# Patient Record
Sex: MALE | Race: WHITE | NOT HISPANIC OR LATINO | URBAN - METROPOLITAN AREA
[De-identification: names, ages, dates, MRNs, and addresses within clinical notes are randomized per-mention and may not be internally consistent; named-entity substitution may affect disease eponyms.]

---
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
## 2023-10-24 ENCOUNTER — INPATIENT (INPATIENT)
Facility: HOSPITAL | Age: 23
LOS: 1 days | Discharge: HOME | DRG: 917 | End: 2023-10-26
Attending: FAMILY MEDICINE | Admitting: FAMILY MEDICINE
Payer: COMMERCIAL

## 2023-10-24 VITALS
DIASTOLIC BLOOD PRESSURE: 65 MMHG | TEMPERATURE: 98 F | SYSTOLIC BLOOD PRESSURE: 118 MMHG | HEART RATE: 159 BPM | RESPIRATION RATE: 20 BRPM | OXYGEN SATURATION: 96 %

## 2023-10-24 DIAGNOSIS — F19.929 OTHER PSYCHOACTIVE SUBSTANCE USE, UNSPECIFIED WITH INTOXICATION, UNSPECIFIED: ICD-10-CM

## 2023-10-24 DIAGNOSIS — Z29.9 ENCOUNTER FOR PROPHYLACTIC MEASURES, UNSPECIFIED: ICD-10-CM

## 2023-10-24 DIAGNOSIS — R41.82 ALTERED MENTAL STATUS, UNSPECIFIED: ICD-10-CM

## 2023-10-24 LAB
ALBUMIN SERPL ELPH-MCNC: 3.4 G/DL — SIGNIFICANT CHANGE UP (ref 3.3–5)
ALBUMIN SERPL ELPH-MCNC: 3.4 G/DL — SIGNIFICANT CHANGE UP (ref 3.3–5)
ALP SERPL-CCNC: 45 U/L — SIGNIFICANT CHANGE UP (ref 40–120)
ALP SERPL-CCNC: 45 U/L — SIGNIFICANT CHANGE UP (ref 40–120)
ALT FLD-CCNC: 32 U/L — SIGNIFICANT CHANGE UP (ref 12–78)
ALT FLD-CCNC: 32 U/L — SIGNIFICANT CHANGE UP (ref 12–78)
ANION GAP SERPL CALC-SCNC: 7 MMOL/L — SIGNIFICANT CHANGE UP (ref 5–17)
ANION GAP SERPL CALC-SCNC: 7 MMOL/L — SIGNIFICANT CHANGE UP (ref 5–17)
APAP SERPL-MCNC: <2 UG/ML — LOW (ref 10–30)
APAP SERPL-MCNC: <2 UG/ML — LOW (ref 10–30)
APPEARANCE UR: CLEAR — SIGNIFICANT CHANGE UP
APPEARANCE UR: CLEAR — SIGNIFICANT CHANGE UP
AST SERPL-CCNC: 16 U/L — SIGNIFICANT CHANGE UP (ref 15–37)
AST SERPL-CCNC: 16 U/L — SIGNIFICANT CHANGE UP (ref 15–37)
BASOPHILS # BLD AUTO: 0.04 K/UL — SIGNIFICANT CHANGE UP (ref 0–0.2)
BASOPHILS # BLD AUTO: 0.04 K/UL — SIGNIFICANT CHANGE UP (ref 0–0.2)
BASOPHILS NFR BLD AUTO: 0.4 % — SIGNIFICANT CHANGE UP (ref 0–2)
BASOPHILS NFR BLD AUTO: 0.4 % — SIGNIFICANT CHANGE UP (ref 0–2)
BILIRUB SERPL-MCNC: 0.9 MG/DL — SIGNIFICANT CHANGE UP (ref 0.2–1.2)
BILIRUB SERPL-MCNC: 0.9 MG/DL — SIGNIFICANT CHANGE UP (ref 0.2–1.2)
BILIRUB UR-MCNC: NEGATIVE — SIGNIFICANT CHANGE UP
BILIRUB UR-MCNC: NEGATIVE — SIGNIFICANT CHANGE UP
BUN SERPL-MCNC: 11 MG/DL — SIGNIFICANT CHANGE UP (ref 7–23)
BUN SERPL-MCNC: 11 MG/DL — SIGNIFICANT CHANGE UP (ref 7–23)
CALCIUM SERPL-MCNC: 8.1 MG/DL — LOW (ref 8.5–10.1)
CALCIUM SERPL-MCNC: 8.1 MG/DL — LOW (ref 8.5–10.1)
CHLORIDE SERPL-SCNC: 111 MMOL/L — HIGH (ref 96–108)
CHLORIDE SERPL-SCNC: 111 MMOL/L — HIGH (ref 96–108)
CK SERPL-CCNC: 84 U/L — SIGNIFICANT CHANGE UP (ref 26–308)
CK SERPL-CCNC: 84 U/L — SIGNIFICANT CHANGE UP (ref 26–308)
CO2 SERPL-SCNC: 28 MMOL/L — SIGNIFICANT CHANGE UP (ref 22–31)
CO2 SERPL-SCNC: 28 MMOL/L — SIGNIFICANT CHANGE UP (ref 22–31)
COLOR SPEC: YELLOW — SIGNIFICANT CHANGE UP
COLOR SPEC: YELLOW — SIGNIFICANT CHANGE UP
CREAT SERPL-MCNC: 0.84 MG/DL — SIGNIFICANT CHANGE UP (ref 0.5–1.3)
CREAT SERPL-MCNC: 0.84 MG/DL — SIGNIFICANT CHANGE UP (ref 0.5–1.3)
DIFF PNL FLD: NEGATIVE — SIGNIFICANT CHANGE UP
DIFF PNL FLD: NEGATIVE — SIGNIFICANT CHANGE UP
EGFR: 127 ML/MIN/1.73M2 — SIGNIFICANT CHANGE UP
EGFR: 127 ML/MIN/1.73M2 — SIGNIFICANT CHANGE UP
EOSINOPHIL # BLD AUTO: 0.06 K/UL — SIGNIFICANT CHANGE UP (ref 0–0.5)
EOSINOPHIL # BLD AUTO: 0.06 K/UL — SIGNIFICANT CHANGE UP (ref 0–0.5)
EOSINOPHIL NFR BLD AUTO: 0.6 % — SIGNIFICANT CHANGE UP (ref 0–6)
EOSINOPHIL NFR BLD AUTO: 0.6 % — SIGNIFICANT CHANGE UP (ref 0–6)
ETHANOL SERPL-MCNC: <10 MG/DL — SIGNIFICANT CHANGE UP (ref 0–10)
ETHANOL SERPL-MCNC: <10 MG/DL — SIGNIFICANT CHANGE UP (ref 0–10)
GLUCOSE SERPL-MCNC: 105 MG/DL — HIGH (ref 70–99)
GLUCOSE SERPL-MCNC: 105 MG/DL — HIGH (ref 70–99)
GLUCOSE UR QL: NEGATIVE MG/DL — SIGNIFICANT CHANGE UP
GLUCOSE UR QL: NEGATIVE MG/DL — SIGNIFICANT CHANGE UP
HCT VFR BLD CALC: 42.1 % — SIGNIFICANT CHANGE UP (ref 39–50)
HCT VFR BLD CALC: 42.1 % — SIGNIFICANT CHANGE UP (ref 39–50)
HGB BLD-MCNC: 14.7 G/DL — SIGNIFICANT CHANGE UP (ref 13–17)
HGB BLD-MCNC: 14.7 G/DL — SIGNIFICANT CHANGE UP (ref 13–17)
IMM GRANULOCYTES NFR BLD AUTO: 0.3 % — SIGNIFICANT CHANGE UP (ref 0–0.9)
IMM GRANULOCYTES NFR BLD AUTO: 0.3 % — SIGNIFICANT CHANGE UP (ref 0–0.9)
KETONES UR-MCNC: NEGATIVE MG/DL — SIGNIFICANT CHANGE UP
KETONES UR-MCNC: NEGATIVE MG/DL — SIGNIFICANT CHANGE UP
LACTATE SERPL-SCNC: 1.7 MMOL/L — SIGNIFICANT CHANGE UP (ref 0.7–2)
LACTATE SERPL-SCNC: 1.7 MMOL/L — SIGNIFICANT CHANGE UP (ref 0.7–2)
LACTATE SERPL-SCNC: 2.2 MMOL/L — HIGH (ref 0.7–2)
LACTATE SERPL-SCNC: 2.2 MMOL/L — HIGH (ref 0.7–2)
LEUKOCYTE ESTERASE UR-ACNC: NEGATIVE — SIGNIFICANT CHANGE UP
LEUKOCYTE ESTERASE UR-ACNC: NEGATIVE — SIGNIFICANT CHANGE UP
LIDOCAIN IGE QN: 18 U/L — SIGNIFICANT CHANGE UP (ref 13–75)
LIDOCAIN IGE QN: 18 U/L — SIGNIFICANT CHANGE UP (ref 13–75)
LYMPHOCYTES # BLD AUTO: 0.42 K/UL — LOW (ref 1–3.3)
LYMPHOCYTES # BLD AUTO: 0.42 K/UL — LOW (ref 1–3.3)
LYMPHOCYTES # BLD AUTO: 4 % — LOW (ref 13–44)
LYMPHOCYTES # BLD AUTO: 4 % — LOW (ref 13–44)
MAGNESIUM SERPL-MCNC: 2 MG/DL — SIGNIFICANT CHANGE UP (ref 1.6–2.6)
MAGNESIUM SERPL-MCNC: 2 MG/DL — SIGNIFICANT CHANGE UP (ref 1.6–2.6)
MCHC RBC-ENTMCNC: 28.2 PG — SIGNIFICANT CHANGE UP (ref 27–34)
MCHC RBC-ENTMCNC: 28.2 PG — SIGNIFICANT CHANGE UP (ref 27–34)
MCHC RBC-ENTMCNC: 34.9 GM/DL — SIGNIFICANT CHANGE UP (ref 32–36)
MCHC RBC-ENTMCNC: 34.9 GM/DL — SIGNIFICANT CHANGE UP (ref 32–36)
MCV RBC AUTO: 80.8 FL — SIGNIFICANT CHANGE UP (ref 80–100)
MCV RBC AUTO: 80.8 FL — SIGNIFICANT CHANGE UP (ref 80–100)
MONOCYTES # BLD AUTO: 0.71 K/UL — SIGNIFICANT CHANGE UP (ref 0–0.9)
MONOCYTES # BLD AUTO: 0.71 K/UL — SIGNIFICANT CHANGE UP (ref 0–0.9)
MONOCYTES NFR BLD AUTO: 6.7 % — SIGNIFICANT CHANGE UP (ref 2–14)
MONOCYTES NFR BLD AUTO: 6.7 % — SIGNIFICANT CHANGE UP (ref 2–14)
NEUTROPHILS # BLD AUTO: 9.32 K/UL — HIGH (ref 1.8–7.4)
NEUTROPHILS # BLD AUTO: 9.32 K/UL — HIGH (ref 1.8–7.4)
NEUTROPHILS NFR BLD AUTO: 88 % — HIGH (ref 43–77)
NEUTROPHILS NFR BLD AUTO: 88 % — HIGH (ref 43–77)
NITRITE UR-MCNC: NEGATIVE — SIGNIFICANT CHANGE UP
NITRITE UR-MCNC: NEGATIVE — SIGNIFICANT CHANGE UP
NRBC # BLD: 0 /100 WBCS — SIGNIFICANT CHANGE UP (ref 0–0)
NRBC # BLD: 0 /100 WBCS — SIGNIFICANT CHANGE UP (ref 0–0)
PCP SPEC-MCNC: SIGNIFICANT CHANGE UP
PCP SPEC-MCNC: SIGNIFICANT CHANGE UP
PH UR: 6 — SIGNIFICANT CHANGE UP (ref 5–8)
PH UR: 6 — SIGNIFICANT CHANGE UP (ref 5–8)
PLATELET # BLD AUTO: 187 K/UL — SIGNIFICANT CHANGE UP (ref 150–400)
PLATELET # BLD AUTO: 187 K/UL — SIGNIFICANT CHANGE UP (ref 150–400)
POTASSIUM SERPL-MCNC: 3.9 MMOL/L — SIGNIFICANT CHANGE UP (ref 3.5–5.3)
POTASSIUM SERPL-MCNC: 3.9 MMOL/L — SIGNIFICANT CHANGE UP (ref 3.5–5.3)
POTASSIUM SERPL-SCNC: 3.9 MMOL/L — SIGNIFICANT CHANGE UP (ref 3.5–5.3)
POTASSIUM SERPL-SCNC: 3.9 MMOL/L — SIGNIFICANT CHANGE UP (ref 3.5–5.3)
PROT SERPL-MCNC: 6.4 G/DL — SIGNIFICANT CHANGE UP (ref 6–8.3)
PROT SERPL-MCNC: 6.4 G/DL — SIGNIFICANT CHANGE UP (ref 6–8.3)
PROT UR-MCNC: NEGATIVE MG/DL — SIGNIFICANT CHANGE UP
PROT UR-MCNC: NEGATIVE MG/DL — SIGNIFICANT CHANGE UP
RBC # BLD: 5.21 M/UL — SIGNIFICANT CHANGE UP (ref 4.2–5.8)
RBC # BLD: 5.21 M/UL — SIGNIFICANT CHANGE UP (ref 4.2–5.8)
RBC # FLD: 12.1 % — SIGNIFICANT CHANGE UP (ref 10.3–14.5)
RBC # FLD: 12.1 % — SIGNIFICANT CHANGE UP (ref 10.3–14.5)
SALICYLATES SERPL-MCNC: <1.7 MG/DL — LOW (ref 2.8–20)
SALICYLATES SERPL-MCNC: <1.7 MG/DL — LOW (ref 2.8–20)
SODIUM SERPL-SCNC: 146 MMOL/L — HIGH (ref 135–145)
SODIUM SERPL-SCNC: 146 MMOL/L — HIGH (ref 135–145)
SP GR SPEC: 1.01 — SIGNIFICANT CHANGE UP (ref 1–1.03)
SP GR SPEC: 1.01 — SIGNIFICANT CHANGE UP (ref 1–1.03)
TROPONIN I, HIGH SENSITIVITY RESULT: 4.7 NG/L — SIGNIFICANT CHANGE UP
TROPONIN I, HIGH SENSITIVITY RESULT: 4.7 NG/L — SIGNIFICANT CHANGE UP
TSH SERPL-MCNC: 0.31 UIU/ML — LOW (ref 0.36–3.74)
TSH SERPL-MCNC: 0.31 UIU/ML — LOW (ref 0.36–3.74)
UROBILINOGEN FLD QL: 1 MG/DL — SIGNIFICANT CHANGE UP (ref 0.2–1)
UROBILINOGEN FLD QL: 1 MG/DL — SIGNIFICANT CHANGE UP (ref 0.2–1)
WBC # BLD: 10.58 K/UL — HIGH (ref 3.8–10.5)
WBC # BLD: 10.58 K/UL — HIGH (ref 3.8–10.5)
WBC # FLD AUTO: 10.58 K/UL — HIGH (ref 3.8–10.5)
WBC # FLD AUTO: 10.58 K/UL — HIGH (ref 3.8–10.5)

## 2023-10-24 PROCEDURE — 70498 CT ANGIOGRAPHY NECK: CPT | Mod: 26,MA

## 2023-10-24 PROCEDURE — 70496 CT ANGIOGRAPHY HEAD: CPT | Mod: 26,MA

## 2023-10-24 PROCEDURE — 70450 CT HEAD/BRAIN W/O DYE: CPT | Mod: 26,MA

## 2023-10-24 PROCEDURE — 99285 EMERGENCY DEPT VISIT HI MDM: CPT

## 2023-10-24 PROCEDURE — 99223 1ST HOSP IP/OBS HIGH 75: CPT

## 2023-10-24 PROCEDURE — 71045 X-RAY EXAM CHEST 1 VIEW: CPT | Mod: 26

## 2023-10-24 RX ORDER — MORPHINE SULFATE 50 MG/1
4 CAPSULE, EXTENDED RELEASE ORAL ONCE
Refills: 0 | Status: DISCONTINUED | OUTPATIENT
Start: 2023-10-24 | End: 2023-10-24

## 2023-10-24 RX ORDER — ONDANSETRON 8 MG/1
4 TABLET, FILM COATED ORAL ONCE
Refills: 0 | Status: DISCONTINUED | OUTPATIENT
Start: 2023-10-24 | End: 2023-10-24

## 2023-10-24 RX ORDER — ONDANSETRON 8 MG/1
4 TABLET, FILM COATED ORAL EVERY 8 HOURS
Refills: 0 | Status: DISCONTINUED | OUTPATIENT
Start: 2023-10-24 | End: 2023-10-26

## 2023-10-24 RX ORDER — LANOLIN ALCOHOL/MO/W.PET/CERES
3 CREAM (GRAM) TOPICAL AT BEDTIME
Refills: 0 | Status: DISCONTINUED | OUTPATIENT
Start: 2023-10-24 | End: 2023-10-26

## 2023-10-24 RX ORDER — SODIUM CHLORIDE 9 MG/ML
1000 INJECTION, SOLUTION INTRAVENOUS
Refills: 0 | Status: DISCONTINUED | OUTPATIENT
Start: 2023-10-24 | End: 2023-10-25

## 2023-10-24 RX ORDER — SODIUM CHLORIDE 9 MG/ML
1000 INJECTION INTRAMUSCULAR; INTRAVENOUS; SUBCUTANEOUS ONCE
Refills: 0 | Status: COMPLETED | OUTPATIENT
Start: 2023-10-24 | End: 2023-10-24

## 2023-10-24 RX ORDER — THIAMINE MONONITRATE (VIT B1) 100 MG
500 TABLET ORAL DAILY
Refills: 0 | Status: DISCONTINUED | OUTPATIENT
Start: 2023-10-24 | End: 2023-10-26

## 2023-10-24 RX ORDER — FAMOTIDINE 10 MG/ML
20 INJECTION INTRAVENOUS ONCE
Refills: 0 | Status: COMPLETED | OUTPATIENT
Start: 2023-10-24 | End: 2023-10-24

## 2023-10-24 RX ORDER — INFLUENZA VIRUS VACCINE 15; 15; 15; 15 UG/.5ML; UG/.5ML; UG/.5ML; UG/.5ML
0.5 SUSPENSION INTRAMUSCULAR ONCE
Refills: 0 | Status: DISCONTINUED | OUTPATIENT
Start: 2023-10-24 | End: 2023-10-26

## 2023-10-24 RX ORDER — SODIUM CHLORIDE 9 MG/ML
2000 INJECTION INTRAMUSCULAR; INTRAVENOUS; SUBCUTANEOUS ONCE
Refills: 0 | Status: COMPLETED | OUTPATIENT
Start: 2023-10-24 | End: 2023-10-24

## 2023-10-24 RX ADMIN — SODIUM CHLORIDE 1000 MILLILITER(S): 9 INJECTION INTRAMUSCULAR; INTRAVENOUS; SUBCUTANEOUS at 09:39

## 2023-10-24 RX ADMIN — SODIUM CHLORIDE 75 MILLILITER(S): 9 INJECTION, SOLUTION INTRAVENOUS at 13:12

## 2023-10-24 RX ADMIN — SODIUM CHLORIDE 2000 MILLILITER(S): 9 INJECTION INTRAMUSCULAR; INTRAVENOUS; SUBCUTANEOUS at 06:49

## 2023-10-24 RX ADMIN — FAMOTIDINE 20 MILLIGRAM(S): 10 INJECTION INTRAVENOUS at 06:49

## 2023-10-24 RX ADMIN — Medication 105 MILLIGRAM(S): at 23:05

## 2023-10-24 NOTE — CONSULT NOTE ADULT - PROBLEM SELECTOR RECOMMENDATION 9
MD Gonzales: 23-year-old male brought in by family for evaluation of possible intoxication.  Patient has access to multiple substances that could explain his clinical presentation.  Tachycardia, dry skin, full bladder point towards antimuscarinic toxicity which could be caused from multiple potential substances; most common culprit is diphenhydramine.  Trial of rivastigmine patch is appropriate if available.    Patient also has access to fatty butte powder which is a GERMAN B agonist.  This substance can be consumed for its sedating purposes as well as to improve sleep quality.  However it is highly addictive and regular use can result in dependence that can manifest with significant withdrawal symptoms.  Although clinically the same as benzodiazepine or alcohol withdrawal, it does not respond well to treatment with GERMAN a agonist such as benzodiazepines, phenobarbital and/or propofol.  Should the patient exhibit symptoms consistent with sedative-hypnotic withdrawal we would advocate for treating the patient with a GERMAN-B agonist such as baclofen.    Recommendations:  Trial of physostigmine patch may prove to be diagnostic if his mental status improves to baseline.  However it is not necessary that this be performed.  In general xenobiotic intoxications tend to improve over time.    Withdrawal syndromes tend to worsen over the first 36 hours of admission.    If patient becomes febrile, hyperthermic or exhibits worsening altered mental status would have a low threshold to consider alternative diagnoses such as meningeal encephalitis and should be worked up as such. MD Gonzales: 23-year-old male brought in by family for evaluation of possible intoxication.  Patient has access to multiple substances that could explain his clinical presentation.  Tachycardia, dry skin, full bladder point towards antimuscarinic toxicity which could be caused from multiple potential substances; most common culprit is diphenhydramine.  Trial of rivastigmine patch is appropriate if available.    Patient also has access to Phenybut powder which is a GERMAN B agonist.  This substance can be consumed for its sedating purposes as well as to improve sleep quality.  However it is highly addictive and regular use can result in dependence that can manifest with significant withdrawal symptoms.  Although clinically the same as benzodiazepine or alcohol withdrawal, it does not respond well to treatment with GERMAN a agonist such as benzodiazepines, phenobarbital and/or propofol.  Should the patient exhibit symptoms consistent with sedative-hypnotic withdrawal we would advocate for treating the patient with a GERMAN-B agonist such as baclofen.    Recommendations:  Trial of physostigmine patch may prove to be diagnostic if his mental status improves to baseline.  However it is not necessary that this be performed.  In general xenobiotic intoxications tend to improve over time.    Withdrawal syndromes tend to worsen over the first 36 hours of admission.    If patient becomes febrile, hyperthermic or exhibits worsening altered mental status would have a low threshold to consider alternative diagnoses such as meningeal encephalitis and should be worked up as such.

## 2023-10-24 NOTE — H&P ADULT - NSHPSOCIALHISTORY_GEN_ALL_CORE
Single, just graduated college from [employer], lives with mother. Mother denies smoking history, occasional EtOH use
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
## 2023-10-24 NOTE — CONSULT NOTE ADULT - ASSESSMENT
24 yo M, reportedly healthy, who presents for altered mental status. Toxicology consulted for possible xenobiotic ingestion. Around 4 am, patient was awake and told mom he was not feeling well and emesis, then progressively became obtunded. Other than ethanol, no other acute ingestion reported by patient. Patient has access to phenibut apixaban, nebivolol, and common OTC medications including diphenhydramine. Patient was initially tachycardic to 140s with otherwise normal vitals. HR improving to 110s now. Exam shows initially dilated pupils, dazed, dry mucosa and axilla, and urinary retention. EKG unremarkable. Labs showed Na 146, Cl 111, ca 8.1, lactate 2.2.     It is unclear what is causing the patient's symptoms at this time. From a toxicologic standpoint, the patient's physical examination and vital signs is most consistent with antimuscarinic/anticholinergic toxicity given tachycardia, mydriasis, dry mucosa and axilla, dazed appearance, and urinary retention. Antimuscarinic toxidrome consists of delirium, carphologia, agitation, tachycardia, urinary retention, flushed skin, dry skin and mucus membrane, hypoactive bowel sounds, and/or seizures. Common medications with antimuscarinic effects include 1st generation anti-histamines, antipsychotics, and atropine.    The patient also has access to phenibut, L-theanine, and an unknown dropper bottle. Recreational drug users of GHB and GBL often store and use it from a dropper bottle. Phenibut, GHB, and GBL are GERMAN-B agonists, which can cause a sedative hypnotic toxidrome in overdose, and with abrupt cessation can cause withdrawal. Treatment for acute overdose is largely supportive. Treatment of withdrawal is replacement of GERMAN-B agonism with a gradual taper, and baclofen would be agent of choice.     #Recommendations  - Consider other medical etiologies   - Given clinical suspicion of antimuscarinic toxicity, recommend rivastigmine patch 9.5 mg/24 hours, placed on the upper back. Watch for excessive cholinergic activity as adverse effect, which can include bradycardia, bronchorrhea, bronchospasm, salivation, diarrhea, urination, and lacrimation. If severe adverse effects occur (rare), please remove patch. If severe adverse effects such as bradycardia, bronchorrhea, or bronchospasm, consider atropine administration and call toxicology team  - Watch for signs of GERMAN-B withdrawal (similar clinical findings as ethanol or benzodiazepine withdrawal). If they occur, consider baclofen 5 mg every 8 hours PO or NG, and call toxicology team. May need titration to effect. IV benzodiazepine may be used as an adjunct, however may not be effective in GERMAN-B withdrawal.  - Recommend admission to monitor rectal temperature, telemetry, and assess for above toxicities  - Please obtain caffeine level, acetaminophen level, salicylate level, Mg, CK, and thyroid testing    Thank you for involving us in the care of this patient. Assessment and plan discussed with toxicology attending Dr. Nilson Gonzales. Please do not hesitate to reach out to the toxicology team for any further questions or concerns.    The On-Call Toxicology Fellow can be reached 24/7 via Pager #[phone]  Please send a 10 digit call back # as [place] cover multiple hospitals    Ramon Thomas MD  Toxicology Fellow  PGY-5     24 yo M, reportedly healthy, who presents for altered mental status. Toxicology consulted for possible xenobiotic ingestion. Around 4 am, patient was awake and told mom he was not feeling well and emesis, then progressively became obtunded. Other than ethanol, no other acute ingestion reported by patient. Patient has access to phenibut apixaban, nebivolol, and common OTC medications including diphenhydramine. Patient was initially tachycardic to 140s with otherwise normal vitals. HR improving to 110s now. Exam shows initially dilated pupils, dazed, dry mucosa and axilla, and urinary retention. EKG unremarkable. Labs showed Na 146, Cl 111, ca 8.1, lactate 2.2.     It is unclear what is causing the patient's symptoms at this time. From a toxicologic standpoint, the patient's physical examination and vital signs is most consistent with antimuscarinic/anticholinergic toxicity given tachycardia, mydriasis, dry mucosa and axilla, dazed appearance, and urinary retention. Antimuscarinic toxidrome consists of delirium, carphologia, agitation, tachycardia, urinary retention, flushed skin, dry skin and mucus membrane, hypoactive bowel sounds, and/or seizures. Common medications with antimuscarinic effects include 1st generation anti-histamines, antipsychotics, and atropine.    The patient also has access to phenibut, L-theanine, and an unknown dropper bottle. Recreational drug users of GHB and GBL often store and use it from a dropper bottle. Phenibut, GHB, and GBL are GERMAN-B agonists, which can cause a sedative hypnotic toxidrome in overdose, and with abrupt cessation can cause withdrawal. Treatment for acute overdose is largely supportive. Treatment of withdrawal is replacement of GERMAN-B agonism with a gradual taper, and baclofen would be agent of choice.     #Recommendations  - Consider other medical etiologies   - Given clinical suspicion of antimuscarinic toxicity, recommend rivastigmine patch 9.5 mg/24 hours, placed on the upper back. Watch for excessive cholinergic activity as adverse effect, which can include bradycardia, bronchorrhea, bronchospasm, salivation, diarrhea, urination, and lacrimation. If severe adverse effects occur (rare), please remove patch. If severe adverse effects such as bradycardia, bronchorrhea, or bronchospasm, consider atropine administration and call toxicology team  - Watch for signs of GERMAN-B withdrawal (similar clinical findings as ethanol or benzodiazepine withdrawal). If they occur, consider baclofen as detailed below, and call toxicology team. May need titration to effect. IV benzodiazepine may be used as an adjunct, however may not be effective in GERMAN-B withdrawal.  - Recommend admission to monitor rectal temperature, telemetry, and assess for above toxicities  - Please obtain caffeine level, acetaminophen level, salicylate level, Mg, CK, and thyroid testing    Baclofen Protocol for GERMAN-B Withdrawal  Give patient 20mg PO baclofen now and reassess to see if pts symptoms have improved in 1hr, if pt still with significant withdrawal symptoms 1hr after, give another 10mg PO Baclofen. Admit patient and put patient on CIWA protocol. Symptomatically treat patient with baclofen (analogous to stabilizing a patient with benzos for alcohol withdrawal). Goal is to figure out patient’s 24hr baclofen requirement (total baclofen given within 24hrs). Once 24hr requirement obtained, give this over three doses (TID). Continue induction dose for two days. Then gradually taper dose every two days by 5mg. An example is given below.     -If currently stabilized with 20 mg Baclofen PO per day  -Continue Baclofen 20 mg for 2 days from induction  -On days 3-4: give 15 mg Baclofen PO  -On days 5-6: give 10 mg Baclofen PO  -On days 6-7: Give 5 mg Baclofen PO  -On day 8: DC Baclofen    Thank you for involving us in the care of this patient. Assessment and plan discussed with toxicology attending Dr. Nilson Gonzales. Please do not hesitate to reach out to the toxicology team for any further questions or concerns.    The On-Call Toxicology Fellow can be reached 24/7 via Pager #[phone]  Please send a 10 digit call back # as [place] cover multiple hospitals    Ramon Thomas MD  Toxicology Fellow  PGY-5     24 yo M, reportedly healthy, who presents for altered mental status. Toxicology consulted for possible xenobiotic ingestion. Around 4 am, patient was awake and told mom he was not feeling well and emesis, then progressively became obtunded. Other than ethanol, no other acute ingestion reported by patient. Patient has access to phenibut apixaban, nebivolol, and common OTC medications including diphenhydramine. Patient was initially tachycardic to 140s with otherwise normal vitals. HR improving to 110s now. Exam shows initially dilated pupils, dazed, dry mucosa and axilla, and urinary retention. EKG unremarkable. Labs showed Na 146, Cl 111, ca 8.1, lactate 2.2.     It is unclear what is causing the patient's symptoms at this time. From a toxicologic standpoint, the patient's physical examination and vital signs is most consistent with antimuscarinic/anticholinergic toxicity given tachycardia, mydriasis, dry mucosa and axilla, dazed appearance, and urinary retention. Antimuscarinic toxidrome consists of delirium, carphologia, agitation, tachycardia, urinary retention, flushed skin, dry skin and mucus membrane, hypoactive bowel sounds, and/or seizures. Common medications with antimuscarinic effects include 1st generation anti-histamines, antipsychotics, and atropine.    The patient also has access to phenibut, L-theanine, and an unknown dropper bottle. Recreational drug users of GHB and GBL often store and use it from a dropper bottle. Phenibut, GHB, and GBL are GERMAN-B agonists, which can cause a sedative hypnotic toxidrome in overdose, and with abrupt cessation can cause withdrawal. Treatment for acute overdose is largely supportive. Treatment of withdrawal is replacement of GERMAN-B agonism with a gradual taper, and baclofen would be agent of choice.     #Recommendations  - Consider other medical etiologies   - Given clinical suspicion of antimuscarinic toxicity, recommend rivastigmine patch 9.5 mg/24 hours, placed on the upper back. Watch for excessive cholinergic activity as adverse effect, which can include bradycardia, bronchorrhea, bronchospasm, salivation, diarrhea, urination, and lacrimation. If severe adverse effects occur (rare), please remove patch. If severe adverse effects such as bradycardia, bronchorrhea, or bronchospasm, consider atropine administration and call toxicology team  - Watch for signs of GERMAN-B withdrawal (similar clinical findings as ethanol or benzodiazepine withdrawal). If they occur, consider baclofen as detailed below, and call toxicology team. May need titration to effect. IV benzodiazepine may be used as an adjunct, however may not be effective in GERMAN-B withdrawal.  - Recommend admission to monitor rectal temperature, telemetry, and assess for above toxicities  - Please obtain caffeine level, acetaminophen level, salicylate level, Mg, CK, and thyroid testing    Baclofen Protocol for GERMAN-B Withdrawal  If patient has signs and symptoms of GERMAN-B withdrawal, give patient 20mg PO baclofen now and reassess to see if pts symptoms have improved in 1hr, if pt still with significant withdrawal symptoms 1hr after, give another 10mg PO Baclofen. Admit patient and put patient on CIWA protocol. Symptomatically treat patient with baclofen (analogous to stabilizing a patient with benzos for alcohol withdrawal). Goal is to figure out patient’s 24hr baclofen requirement (total baclofen given within 24hrs). Once 24hr requirement obtained, give this over three doses (TID). Continue induction dose for two days. Then gradually taper dose every two days by 5mg. An example is given below.     -If currently stabilized with 20 mg Baclofen PO per day  -Continue Baclofen 20 mg for 2 days from induction  -On days 3-4: give 15 mg Baclofen PO  -On days 5-6: give 10 mg Baclofen PO  -On days 6-7: Give 5 mg Baclofen PO  -On day 8: DC Baclofen    Thank you for involving us in the care of this patient. Assessment and plan discussed with toxicology attending Dr. Nilson Gonzales. Please do not hesitate to reach out to the toxicology team for any further questions or concerns.    The On-Call Toxicology Fellow can be reached 24/7 via Pager #[phone]  Please send a 10 digit call back # as [place] cover multiple hospitals    Ramon Thomas MD  Toxicology Fellow  PGY-5

## 2023-10-24 NOTE — H&P ADULT - PROBLEM SELECTOR PLAN 1
-Possibly due to metabolic encephalopathy from toxin ingestion  -Toxicology consulted, appreciate recs  -Monitor on remote tele  -CT head and CTA head/neck negative for acute process  -If patient's mental status does not improve with 24 hours or patient becomes febrile, worsening tachycardia, may need to consider other etiologies for AMS  -Consider Neuro coin AM if no improvement in mental status  -Continue IVF for hydration, maintain NPO for now

## 2023-10-24 NOTE — CONSULT NOTE ADULT - ATTENDING COMMENTS
Patient resting comfortably. NAD. NSR on tele. VSS. CE #2 negative. EKG unchanged. Contiune to monitor. Proceed with Nuclear Stress test in am  tox

## 2023-10-24 NOTE — ED ADULT NURSE NOTE - NSFALLUNIVINTERV_ED_ALL_ED
Bed/Stretcher in lowest position, wheels locked, appropriate side rails in place/Call bell, personal items and telephone in reach/Instruct patient to call for assistance before getting out of bed/chair/stretcher/Non-slip footwear applied when patient is off stretcher/[place] to call system/Physically safe environment - no spills, clutter or unnecessary equipment/Purposeful proactive rounding/Room/bathroom lighting operational, light cord in reach

## 2023-10-24 NOTE — PATIENT PROFILE ADULT - FALL HARM RISK - DEVICES
None Bed/Stretcher in lowest position, wheels locked, appropriate side rails in place/Call bell, personal items and telephone in reach/Instruct patient to call for assistance before getting out of bed/chair/stretcher/Non-slip footwear applied when patient is off stretcher/[place] to call system/Physically safe environment - no spills, clutter or unnecessary equipment/Purposeful proactive rounding/Room/bathroom lighting operational, light cord in reach

## 2023-10-24 NOTE — ED PROVIDER NOTE - PROGRESS NOTE DETAILS
HR improved  CTH neg  CXR neg  Labs unremarkable  ETOH level neg  Utox pending  Pt protecting his airway  Mother at bedside, shared all results with her

## 2023-10-24 NOTE — PATIENT PROFILE ADULT - FALL HARM RISK - HARM RISK INTERVENTIONS
Assistance with ambulation/Assistance OOB with selected safe patient handling equipment/Communicate Risk of Fall with Harm to all staff/Monitor for mental status changes/Monitor gait and stability/Reinforce activity limits and safety measures with patient and family/Tailored Fall Risk Interventions/Toileting schedule using arm’s reach rule for commode and bathroom/Use of alarms - bed, chair and/or voice tab/Visual Cue: Yellow wristband and red socks/Bed in lowest position, wheels locked, appropriate side rails in place/Call bell, personal items and telephone in reach/Instruct patient to call for assistance before getting out of bed or chair/Non-slip footwear when patient is out of bed/[place] to call system/Physically safe environment - no spills, clutter or unnecessary equipment/Purposeful Proactive Rounding/Room/bathroom lighting operational, light cord in reach

## 2023-10-24 NOTE — CHART NOTE - NSCHARTNOTEFT_GEN_A_CORE
- RN called that tele monitor showed pt had nonsustained PAT with HR of 130s that decreased to 97   - vitals taken at the time were within normal limits, HR 97  - pt was sleeping, asx  - Mg and Phos ordered for AM  - RN to call with any concerns

## 2023-10-24 NOTE — H&P ADULT - NSICDXFAMILYHX_GEN_ALL_CORE_FT
FAMILY HISTORY:  Grandparent  Still living? Unknown  Family history of CVA, Age at diagnosis: Age Unknown  FH: myocardial infarction, Age at diagnosis: Age Unknown  FHx: skin cancer, Age at diagnosis: Age Unknown

## 2023-10-24 NOTE — H&P ADULT - HISTORY OF PRESENT ILLNESS
24 y/o M with no significant PMHx presents to ED via EMS for vomiting and altered mental status. Patient lethargic and unable to participate in HPI/ROS, collateral information obtained from family at bedside.  22 y/o M with no significant PMHx presents to ED via EMS for vomiting and altered mental status. Patient lethargic and unable to participate in HPI/ROS, collateral information obtained from family at bedside. As per mother, patient awoke her around 4:00/4:30 Am stating he was not feeling well. Mother states patient proceeded to have 4-5 episodes of non-bloody vomitus. Patient had disclosed he had drank 6-7 cans of hard seltzer earlier in the night. Mother states  22 y/o M with no significant PMHx presents to ED via EMS for vomiting and altered mental status. Patient lethargic and unable to participate in HPI/ROS, collateral information obtained from family at bedside. As per mother, patient awoke her around 4:00/4:30 Am stating he was not feeling well. Mother states patient proceeded to have 4-5 episodes of non-bloody vomitus. Patient had disclosed he had drank 6-7 cans of hard seltzer earlier in the night. Mother states patient had HR in 140s and she decided to call EMS to bring him to the hospital for further evaluation. Upon arrival to ED, patient somnolent, awakens to sternal rub and loud noises, but not responding questions and falls back asleep.    In ED, patient with negative Utox, negative EtOH level. Patient received pepcid 20mg IV x1, and NS bolus x2, then continued on maintenance IVF. Patient had CT head and CTA head and neck showing unremarkable CT head and no hemodynamic significant narrowing within the neck. No proximal large vessel occlusion intracranially. Case discussed with Toxicology fellow- States patient's mom went back home, and she discovered the patient also takes inositol supplement, phenibut powder, F-Phenibut powder, l-theanine, and vitamin D. However, patient's symptoms more suggestive of with antimuscarinic/anticholinergic toxicity given tachycardia, mydriasis, dry mucosa and axilla, dazed appearance, and urinary retention. Recommend supportive care and observation at this time.

## 2023-10-24 NOTE — PROVIDER CONTACT NOTE (OTHER) - SITUATION
Notified provider that call received from tele that pt  HR in 130s did not sustain and went back down to 97 per tele tech.

## 2023-10-24 NOTE — ED ADULT NURSE NOTE - OBJECTIVE STATEMENT
Received Pt sleepy but arousable, as per mother Pt drank 6 drinks and was projectile vomiting, Pt does not usually drink alcohol.

## 2023-10-24 NOTE — H&P ADULT - NSHPPHYSICALEXAM_GEN_ALL_CORE
General: lethargic, somnolent, NAD  HEENT: NCAT, sluggish pupillary reaction, dry mucous membranes   Neurology: nonfocal, unable to participate  Respiratory: CTA B/L, No W/R/R  CV: RRR, +S1/S2, no murmurs, rubs or gallops  Abdominal: Soft, NT, ND +BSx4  Extremities: No C/C/E, + peripheral pulses  Skin: warm, dry, normal color

## 2023-10-24 NOTE — ED ADULT TRIAGE NOTE - CHIEF COMPLAINT QUOTE
per ems from home, mom called after pt was intoxicated and projectile vomiting. pt was given iv zofran by ems

## 2023-10-24 NOTE — ED ADULT NURSE REASSESSMENT NOTE - NS ED NURSE REASSESS COMMENT FT1
as per mother, pt "took phenibut at home and that is why he is feeling this way." pt remains asleep at this time, on end tidal. will continue to monitor.
pt resting comfortably in bed, denies complaints. arousable to sternal rub at this time, MD Adams made aware. remains sinus tachy to 120s on cardiac monitor. vs as noted in flowsheet. will continue to monitor.

## 2023-10-24 NOTE — H&P ADULT - ASSESSMENT
24 y/o M with no significant PMHx presents to ED via EMS for vomiting and altered mental status being admitted for metabolic encephalopathy, possible toxin overdose.

## 2023-10-24 NOTE — H&P ADULT - NSICDXPASTSURGICALHX_GEN_ALL_CORE_FT
Patient: Dick Valdovinos    Procedure(s):  LEFT SHOULDER ARTHROSCOPY, ROTATOR CUFF REPAIR - Wound Class: I-Clean    Diagnosis: LEFT SHOULDER ROTATOR CUFF TEAR  Diagnosis Additional Information: No value filed.    Anesthesia Type:   General, ETT     Note:  Airway :Nasal Cannula  Patient transferred to:PACU  Handoff Report: Identifed the Patient, Identified the Reponsible Provider, Reviewed the pertinent medical history, Discussed the surgical course, Reviewed Intra-OP anesthesia mangement and issues during anesthesia, Set expectations for post-procedure period and Allowed opportunity for questions and acknowledgement of understanding      Vitals: (Last set prior to Anesthesia Care Transfer)    CRNA VITALS  4/17/2018 1055 - 4/17/2018 1130      4/17/2018             Resp Rate (observed): (!)  6                Electronically Signed By: DENI Barnett CRNA  April 17, 2018  11:30 AM  
PAST SURGICAL HISTORY:  No significant past surgical history

## 2023-10-24 NOTE — ED ADULT NURSE NOTE - TEMPLATE LIST FOR HEAD TO TOE ASSESSMENT
I have personally seen and examined the patient. I have collaborated with and supervised the
General

## 2023-10-24 NOTE — ED PROVIDER NOTE - CLINICAL SUMMARY MEDICAL DECISION MAKING FREE TEXT BOX
22 yo M with possible ETOH/drug intoxication. R/o intracranial pathology, electrolyte abnormality. Will get labs, EKG, CXR, CTH. Will give IVFs. Pt received zofran by EMS. No vomiting now. Will reassess.

## 2023-10-24 NOTE — H&P ADULT - PROBLEM SELECTOR PLAN 2
-Case discussed with Toxicology fellow- States patient's mom went back home, and she discovered the patient also takes inositol supplement, phenibut powder, F-Phenibut powder, l-theanine, and vitamin D. However, patient's symptoms more suggestive of with antimuscarinic/anticholinergic toxicity given tachycardia, mydriasis, dry mucosa and axilla, dazed appearance, and urinary retention. Recommend supportive care and observation at this time.  -Addiction medicine- Dr. Kendall, consulted, appreciate recs

## 2023-10-24 NOTE — ED PROVIDER NOTE - OBJECTIVE STATEMENT
22 yo M BIb mother via EMS for vomiting and alcohol intoxication. Mother, who is a physician at [employer], states pt woke her up from sleep and was vomiting. Mother reports pt drank etoh, but denies other drugs. No hx of etoh or drug abuse. No medical hx. Pt then began to vomit, 4-5 episodes. Mother reports pt was also becoming somnolent. Pt awakens to loud verbal stimuli and sternal rub, moans, and then goes to sleep again. Does not answer questions. No hx of trauma.

## 2023-10-25 DIAGNOSIS — R50.9 FEVER, UNSPECIFIED: ICD-10-CM

## 2023-10-25 LAB
ALBUMIN SERPL ELPH-MCNC: 3.1 G/DL — LOW (ref 3.3–5)
ALBUMIN SERPL ELPH-MCNC: 3.1 G/DL — LOW (ref 3.3–5)
ALP SERPL-CCNC: 42 U/L — SIGNIFICANT CHANGE UP (ref 40–120)
ALP SERPL-CCNC: 42 U/L — SIGNIFICANT CHANGE UP (ref 40–120)
ALT FLD-CCNC: 26 U/L — SIGNIFICANT CHANGE UP (ref 12–78)
ALT FLD-CCNC: 26 U/L — SIGNIFICANT CHANGE UP (ref 12–78)
ANION GAP SERPL CALC-SCNC: 3 MMOL/L — LOW (ref 5–17)
ANION GAP SERPL CALC-SCNC: 3 MMOL/L — LOW (ref 5–17)
AST SERPL-CCNC: 14 U/L — LOW (ref 15–37)
AST SERPL-CCNC: 14 U/L — LOW (ref 15–37)
BASOPHILS # BLD AUTO: 0 K/UL — SIGNIFICANT CHANGE UP (ref 0–0.2)
BASOPHILS # BLD AUTO: 0 K/UL — SIGNIFICANT CHANGE UP (ref 0–0.2)
BASOPHILS NFR BLD AUTO: 0 % — SIGNIFICANT CHANGE UP (ref 0–2)
BASOPHILS NFR BLD AUTO: 0 % — SIGNIFICANT CHANGE UP (ref 0–2)
BILIRUB SERPL-MCNC: 1 MG/DL — SIGNIFICANT CHANGE UP (ref 0.2–1.2)
BILIRUB SERPL-MCNC: 1 MG/DL — SIGNIFICANT CHANGE UP (ref 0.2–1.2)
BUN SERPL-MCNC: 5 MG/DL — LOW (ref 7–23)
BUN SERPL-MCNC: 5 MG/DL — LOW (ref 7–23)
CALCIUM SERPL-MCNC: 8.8 MG/DL — SIGNIFICANT CHANGE UP (ref 8.5–10.1)
CALCIUM SERPL-MCNC: 8.8 MG/DL — SIGNIFICANT CHANGE UP (ref 8.5–10.1)
CHLORIDE SERPL-SCNC: 111 MMOL/L — HIGH (ref 96–108)
CHLORIDE SERPL-SCNC: 111 MMOL/L — HIGH (ref 96–108)
CO2 SERPL-SCNC: 31 MMOL/L — SIGNIFICANT CHANGE UP (ref 22–31)
CO2 SERPL-SCNC: 31 MMOL/L — SIGNIFICANT CHANGE UP (ref 22–31)
CREAT SERPL-MCNC: 0.77 MG/DL — SIGNIFICANT CHANGE UP (ref 0.5–1.3)
CREAT SERPL-MCNC: 0.77 MG/DL — SIGNIFICANT CHANGE UP (ref 0.5–1.3)
EGFR: 131 ML/MIN/1.73M2 — SIGNIFICANT CHANGE UP
EGFR: 131 ML/MIN/1.73M2 — SIGNIFICANT CHANGE UP
EOSINOPHIL # BLD AUTO: 0.06 K/UL — SIGNIFICANT CHANGE UP (ref 0–0.5)
EOSINOPHIL # BLD AUTO: 0.06 K/UL — SIGNIFICANT CHANGE UP (ref 0–0.5)
EOSINOPHIL NFR BLD AUTO: 1 % — SIGNIFICANT CHANGE UP (ref 0–6)
EOSINOPHIL NFR BLD AUTO: 1 % — SIGNIFICANT CHANGE UP (ref 0–6)
GLUCOSE SERPL-MCNC: 108 MG/DL — HIGH (ref 70–99)
GLUCOSE SERPL-MCNC: 108 MG/DL — HIGH (ref 70–99)
HCT VFR BLD CALC: 39.2 % — SIGNIFICANT CHANGE UP (ref 39–50)
HCT VFR BLD CALC: 39.2 % — SIGNIFICANT CHANGE UP (ref 39–50)
HGB BLD-MCNC: 13.7 G/DL — SIGNIFICANT CHANGE UP (ref 13–17)
HGB BLD-MCNC: 13.7 G/DL — SIGNIFICANT CHANGE UP (ref 13–17)
IMM GRANULOCYTES NFR BLD AUTO: 0.2 % — SIGNIFICANT CHANGE UP (ref 0–0.9)
IMM GRANULOCYTES NFR BLD AUTO: 0.2 % — SIGNIFICANT CHANGE UP (ref 0–0.9)
LYMPHOCYTES # BLD AUTO: 0.78 K/UL — LOW (ref 1–3.3)
LYMPHOCYTES # BLD AUTO: 0.78 K/UL — LOW (ref 1–3.3)
LYMPHOCYTES # BLD AUTO: 13.5 % — SIGNIFICANT CHANGE UP (ref 13–44)
LYMPHOCYTES # BLD AUTO: 13.5 % — SIGNIFICANT CHANGE UP (ref 13–44)
MAGNESIUM SERPL-MCNC: 2.2 MG/DL — SIGNIFICANT CHANGE UP (ref 1.6–2.6)
MAGNESIUM SERPL-MCNC: 2.2 MG/DL — SIGNIFICANT CHANGE UP (ref 1.6–2.6)
MCHC RBC-ENTMCNC: 28.8 PG — SIGNIFICANT CHANGE UP (ref 27–34)
MCHC RBC-ENTMCNC: 28.8 PG — SIGNIFICANT CHANGE UP (ref 27–34)
MCHC RBC-ENTMCNC: 34.9 GM/DL — SIGNIFICANT CHANGE UP (ref 32–36)
MCHC RBC-ENTMCNC: 34.9 GM/DL — SIGNIFICANT CHANGE UP (ref 32–36)
MCV RBC AUTO: 82.5 FL — SIGNIFICANT CHANGE UP (ref 80–100)
MCV RBC AUTO: 82.5 FL — SIGNIFICANT CHANGE UP (ref 80–100)
MONOCYTES # BLD AUTO: 0.7 K/UL — SIGNIFICANT CHANGE UP (ref 0–0.9)
MONOCYTES # BLD AUTO: 0.7 K/UL — SIGNIFICANT CHANGE UP (ref 0–0.9)
MONOCYTES NFR BLD AUTO: 12.1 % — SIGNIFICANT CHANGE UP (ref 2–14)
MONOCYTES NFR BLD AUTO: 12.1 % — SIGNIFICANT CHANGE UP (ref 2–14)
NEUTROPHILS # BLD AUTO: 4.23 K/UL — SIGNIFICANT CHANGE UP (ref 1.8–7.4)
NEUTROPHILS # BLD AUTO: 4.23 K/UL — SIGNIFICANT CHANGE UP (ref 1.8–7.4)
NEUTROPHILS NFR BLD AUTO: 73.2 % — SIGNIFICANT CHANGE UP (ref 43–77)
NEUTROPHILS NFR BLD AUTO: 73.2 % — SIGNIFICANT CHANGE UP (ref 43–77)
NRBC # BLD: 0 /100 WBCS — SIGNIFICANT CHANGE UP (ref 0–0)
NRBC # BLD: 0 /100 WBCS — SIGNIFICANT CHANGE UP (ref 0–0)
PHOSPHATE SERPL-MCNC: 2.1 MG/DL — LOW (ref 2.5–4.5)
PHOSPHATE SERPL-MCNC: 2.1 MG/DL — LOW (ref 2.5–4.5)
PLATELET # BLD AUTO: 171 K/UL — SIGNIFICANT CHANGE UP (ref 150–400)
PLATELET # BLD AUTO: 171 K/UL — SIGNIFICANT CHANGE UP (ref 150–400)
POTASSIUM SERPL-MCNC: 3.2 MMOL/L — LOW (ref 3.5–5.3)
POTASSIUM SERPL-MCNC: 3.2 MMOL/L — LOW (ref 3.5–5.3)
POTASSIUM SERPL-SCNC: 3.2 MMOL/L — LOW (ref 3.5–5.3)
POTASSIUM SERPL-SCNC: 3.2 MMOL/L — LOW (ref 3.5–5.3)
PROT SERPL-MCNC: 5.9 G/DL — LOW (ref 6–8.3)
PROT SERPL-MCNC: 5.9 G/DL — LOW (ref 6–8.3)
RAPID RVP RESULT: DETECTED
RAPID RVP RESULT: DETECTED
RBC # BLD: 4.75 M/UL — SIGNIFICANT CHANGE UP (ref 4.2–5.8)
RBC # BLD: 4.75 M/UL — SIGNIFICANT CHANGE UP (ref 4.2–5.8)
RBC # FLD: 12.5 % — SIGNIFICANT CHANGE UP (ref 10.3–14.5)
RBC # FLD: 12.5 % — SIGNIFICANT CHANGE UP (ref 10.3–14.5)
SARS-COV-2 RNA SPEC QL NAA+PROBE: DETECTED
SARS-COV-2 RNA SPEC QL NAA+PROBE: DETECTED
SODIUM SERPL-SCNC: 145 MMOL/L — SIGNIFICANT CHANGE UP (ref 135–145)
SODIUM SERPL-SCNC: 145 MMOL/L — SIGNIFICANT CHANGE UP (ref 135–145)
WBC # BLD: 5.78 K/UL — SIGNIFICANT CHANGE UP (ref 3.8–10.5)
WBC # BLD: 5.78 K/UL — SIGNIFICANT CHANGE UP (ref 3.8–10.5)
WBC # FLD AUTO: 5.78 K/UL — SIGNIFICANT CHANGE UP (ref 3.8–10.5)
WBC # FLD AUTO: 5.78 K/UL — SIGNIFICANT CHANGE UP (ref 3.8–10.5)

## 2023-10-25 PROCEDURE — 99233 SBSQ HOSP IP/OBS HIGH 50: CPT

## 2023-10-25 PROCEDURE — 99222 1ST HOSP IP/OBS MODERATE 55: CPT

## 2023-10-25 RX ORDER — SODIUM CHLORIDE 9 MG/ML
1000 INJECTION, SOLUTION INTRAVENOUS
Refills: 0 | Status: DISCONTINUED | OUTPATIENT
Start: 2023-10-25 | End: 2023-10-26

## 2023-10-25 RX ORDER — POTASSIUM CHLORIDE 20 MEQ
40 PACKET (EA) ORAL ONCE
Refills: 0 | Status: COMPLETED | OUTPATIENT
Start: 2023-10-25 | End: 2023-10-25

## 2023-10-25 RX ORDER — POTASSIUM PHOSPHATE, MONOBASIC POTASSIUM PHOSPHATE, DIBASIC 236; 224 MG/ML; MG/ML
15 INJECTION, SOLUTION INTRAVENOUS ONCE
Refills: 0 | Status: COMPLETED | OUTPATIENT
Start: 2023-10-25 | End: 2023-10-25

## 2023-10-25 RX ORDER — ACETAMINOPHEN 500 MG
500 TABLET ORAL ONCE
Refills: 0 | Status: COMPLETED | OUTPATIENT
Start: 2023-10-25 | End: 2023-10-25

## 2023-10-25 RX ORDER — ACETAMINOPHEN 500 MG
650 TABLET ORAL EVERY 6 HOURS
Refills: 0 | Status: DISCONTINUED | OUTPATIENT
Start: 2023-10-25 | End: 2023-10-26

## 2023-10-25 RX ADMIN — POTASSIUM PHOSPHATE, MONOBASIC POTASSIUM PHOSPHATE, DIBASIC 62.5 MILLIMOLE(S): 236; 224 INJECTION, SOLUTION INTRAVENOUS at 11:16

## 2023-10-25 RX ADMIN — Medication 500 MILLIGRAM(S): at 15:30

## 2023-10-25 RX ADMIN — Medication 650 MILLIGRAM(S): at 11:18

## 2023-10-25 RX ADMIN — Medication 40 MILLIEQUIVALENT(S): at 11:15

## 2023-10-25 RX ADMIN — Medication 105 MILLIGRAM(S): at 23:01

## 2023-10-25 RX ADMIN — Medication 200 MILLIGRAM(S): at 15:00

## 2023-10-25 RX ADMIN — SODIUM CHLORIDE 75 MILLILITER(S): 9 INJECTION, SOLUTION INTRAVENOUS at 07:15

## 2023-10-25 RX ADMIN — Medication 650 MILLIGRAM(S): at 12:18

## 2023-10-25 NOTE — PROGRESS NOTE ADULT - PROBLEM SELECTOR PLAN 3
-Noted to have fever 101.5F this AM  -Possible aspiration PNA, however CXR on admission without clear infiltrate, patient refusing repeat CXR  -Check blood cx, UA  -ID consulted, appreciate recs  -Possible fever due to drug intoxication/withdrawal

## 2023-10-25 NOTE — PROGRESS NOTE ADULT - SUBJECTIVE AND OBJECTIVE BOX
INTERVAL HPI/OVERNIGHT EVENTS:  Patient seen and examined at bedside. Patient much more awake and alert today. Mother at bedside during encounter, states patient woke up around 3:30AM this morning and was asking for food and behaving more like himself. Patient able to answer questions appropriately, however still not disclosing if he took any substances prior to coming to ED. Does state he remembers coming to the hospital and going through multiple tests. Patient endorses feeling weak, denies any chest pain, SOB, abd pain. Denies any cough, but mother states patient has been coughing since yesterday. Noted to have temp 101.5F this AM.    ROS: All other review of systems is negative unless indicated above.    MEDICATIONS  (STANDING):  influenza   Vaccine 0.5 milliLiter(s) IntraMuscular once  thiamine IVPB 500 milliGRAM(s) IV Intermittent daily    MEDICATIONS  (PRN):  acetaminophen     Tablet .. 650 milliGRAM(s) Oral every 6 hours PRN Temp greater or equal to 38C (100.4F), Mild Pain (1 - 3)  aluminum hydroxide/magnesium hydroxide/simethicone Suspension 30 milliLiter(s) Oral every 4 hours PRN Dyspepsia  LORazepam   Injectable 0.5 milliGRAM(s) IV Push every 30 minutes PRN for ciwa > 6  melatonin 3 milliGRAM(s) Oral at bedtime PRN Insomnia  ondansetron Injectable 4 milliGRAM(s) IV Push every 8 hours PRN Nausea and/or Vomiting      Allergies    Motrin (Unknown)  Carrots (Unknown)  amoxicillin (Unknown)  Nuts (Unknown)  penicillin (Unknown)    Intolerances        Vital Signs Last 24 Hrs  T(C): 38.6 (25 Oct 2023 10:59), Max: 38.6 (25 Oct 2023 10:59)  T(F): 101.5 (25 Oct 2023 10:59), Max: 101.5 (25 Oct 2023 10:59)  HR: 118 (25 Oct 2023 05:00) (95 - 118)  BP: 100/50 (25 Oct 2023 05:00) (100/50 - 109/54)  BP(mean): --  RR: 16 (25 Oct 2023 05:00) (13 - 20)  SpO2: 94% (25 Oct 2023 05:00) (94% - 100%)    Parameters below as of 25 Oct 2023 05:00  Patient On (Oxygen Delivery Method): nasal cannula  O2 Flow (L/min): 3      10-24 @ 07:01  -  10-25 @ 07:00  --------------------------------------------------------  IN: 900 mL / OUT: 2000 mL / NET: -1100 mL      Physical Exam:  General: lethargic, but more alert than yesterday, arousable to verbal stimuli, but falls back asleep quickly, NAD  HEENT: NCAT, dry mucous membranes   Neurology: nonfocal, CN II-XII intact, 5/5 strength all extremities  Respiratory: CTA B/L, No W/R/R  CV: RRR, +S1/S2, no murmurs, rubs or gallops  Abdominal: Soft, NT, ND +BSx4  Extremities: No C/C/E, + peripheral pulses  Skin: warm, dry, normal color      LABS:                        13.7   5.78  )-----------( 171      ( 25 Oct 2023 07:50 )             39.2     10-25    145  |  111<H>  |  5<L>  ----------------------------<  108<H>  3.2<L>   |  31  |  0.77    Ca    8.8      25 Oct 2023 07:50  Phos  2.1     10-25  Mg     2.2     10-25    TPro  5.9<L>  /  Alb  3.1<L>  /  TBili  1.0  /  DBili  x   /  AST  14<L>  /  ALT  26  /  AlkPhos  42  10-25      Urinalysis Basic - ( 25 Oct 2023 07:50 )    Color: x / Appearance: x / SG: x / pH: x  Gluc: 108 mg/dL / Ketone: x  / Bili: x / Urobili: x   Blood: x / Protein: x / Nitrite: x   Leuk Esterase: x / RBC: x / WBC x   Sq Epi: x / Non Sq Epi: x / Bacteria: x        RADIOLOGY & ADDITIONAL TESTS:

## 2023-10-25 NOTE — PROGRESS NOTE ADULT - PROBLEM SELECTOR PLAN 2
-Case discussed with Toxicology fellow- States patient's mom went back home, and she discovered the patient also takes inositol supplement, phenibut powder, F-Phenibut powder, l-theanine, and vitamin D. However, patient's symptoms more suggestive of with antimuscarinic/anticholinergic toxicity given tachycardia, mydriasis, dry mucosa and axilla, dazed appearance, and urinary retention. Recommend supportive care and observation at this time.  -Addiction medicine- Dr. Kendall, consulted, appreciate recs  -Monitor on remote tele  -Patient does not want to disclose if he took any other medication/drug

## 2023-10-25 NOTE — PROGRESS NOTE ADULT - SUBJECTIVE AND OBJECTIVE BOX
Date/Time Patient Seen:  		  Referring MD:   Data Reviewed	       Patient is a 23y old  Male who presents with a chief complaint of metabolic encephalopathy, nausea/vomiting (24 Oct 2023 17:16)      Subjective/HPI     PAST MEDICAL & SURGICAL HISTORY:  No pertinent past medical history    No significant past surgical history          Medication list         MEDICATIONS  (STANDING):  dextrose 5% + sodium chloride 0.45%. 1000 milliLiter(s) (75 mL/Hr) IV Continuous <Continuous>  influenza   Vaccine 0.5 milliLiter(s) IntraMuscular once  thiamine IVPB 500 milliGRAM(s) IV Intermittent daily    MEDICATIONS  (PRN):  aluminum hydroxide/magnesium hydroxide/simethicone Suspension 30 milliLiter(s) Oral every 4 hours PRN Dyspepsia  LORazepam   Injectable 0.5 milliGRAM(s) IV Push every 30 minutes PRN for ciwa > 6  melatonin 3 milliGRAM(s) Oral at bedtime PRN Insomnia  ondansetron Injectable 4 milliGRAM(s) IV Push every 8 hours PRN Nausea and/or Vomiting         Vitals log        ICU Vital Signs Last 24 Hrs  T(C): 37.6 (25 Oct 2023 05:00), Max: 37.6 (25 Oct 2023 05:00)  T(F): 99.7 (25 Oct 2023 05:00), Max: 99.7 (25 Oct 2023 05:00)  HR: 118 (25 Oct 2023 05:00) (95 - 159)  BP: 100/50 (25 Oct 2023 05:00) (100/50 - 120/59)  BP(mean): --  ABP: --  ABP(mean): --  RR: 16 (25 Oct 2023 05:00) (13 - 20)  SpO2: 94% (25 Oct 2023 05:00) (94% - 100%)    O2 Parameters below as of 25 Oct 2023 05:00  Patient On (Oxygen Delivery Method): nasal cannula  O2 Flow (L/min): 3               Input and Output:  I&O's Detail    24 Oct 2023 07:01  -  25 Oct 2023 05:15  --------------------------------------------------------  IN:  Total IN: 0 mL    OUT:    Indwelling Catheter - Urethral (mL): 1000 mL  Total OUT: 1000 mL    Total NET: -1000 mL          Lab Data                        14.7   10.58 )-----------( 187      ( 24 Oct 2023 06:45 )             42.1     10-24    146<H>  |  111<H>  |  11  ----------------------------<  105<H>  3.9   |  28  |  0.84    Ca    8.1<L>      24 Oct 2023 06:45  Mg     2.0     10-24    TPro  6.4  /  Alb  3.4  /  TBili  0.9  /  DBili  x   /  AST  16  /  ALT  32  /  AlkPhos  45  10-24      CARDIAC MARKERS ( 24 Oct 2023 11:25 )  x     / x     / 84 U/L / x     / x            Review of Systems	      Objective     Physical Examination    heart s1s2  lung dec BS  head nc      Pertinent Lab findings & Imaging      Mani:  NO   Adequate UO     I&O's Detail    24 Oct 2023 07:01  -  25 Oct 2023 05:15  --------------------------------------------------------  IN:  Total IN: 0 mL    OUT:    Indwelling Catheter - Urethral (mL): 1000 mL  Total OUT: 1000 mL    Total NET: -1000 mL               Discussed with:     Cultures:	        Radiology

## 2023-10-25 NOTE — CARE COORDINATION ASSESSMENT. - REASON FOR CONSULT
Per pt mother, pt recently graduated from [employer]. He reportedly tried marijuana and alcohol but as far as report from his mother, he has not actively used substances./substance abuse issues

## 2023-10-25 NOTE — CONSULT NOTE ADULT - SUBJECTIVE AND OBJECTIVE BOX
[facility]  INFECTIOUS DISEASES   [address] [place] [address]  Tel: [phone]     Fax: [phone]  ========================================================  MD Boris Gleason Kaushal, MD Cho, Michelle, MD Sunjit, Jaspal, MD  ========================================================    [id number]  DIANE SALEH     CC:  metabolic encephalopathy, nausea/vomiting     HPI:  22 y/o man with no significant PMH presents to ED via EMS for vomiting and altered mental status. Patient lethargic but answering questions, mother at the bedside helping with the history.   As per admitting note " As per mother, patient awoke her around 4:00/4:30 Am stating he was not feeling well. Mother states patient proceeded to have 4-5 episodes of non-bloody vomitus. Patient had disclosed he had drank 6-7 cans of hard seltzer earlier in the night. Mother states patient had HR in 140s and she decided to call EMS to bring him to the hospital for further evaluation. Upon arrival to ED, patient somnolent, awakens to sternal rub and loud noises, but not responding questions and falls back asleep.  In ED, patient with negative Utox, negative EtOH level. Patient received pepcid 20mg IV x1, and NS bolus x2, then continued on maintenance IVF. Patient had CT head and CTA head and neck showing unremarkable CT head and no hemodynamic significant narrowing within the neck. No proximal large vessel occlusion intracranially. Case discussed with Toxicology fellow- States patient's mom went back home, and she discovered the patient also takes inositol supplement, phenibut powder, F-Phenibut powder, l-theanine, and vitamin D. However, patient's symptoms more suggestive of with antimuscarinic/anticholinergic toxicity given tachycardia, mydriasis, dry mucosa and axilla, dazed appearance, and urinary retention. Recommended supportive care and observation"     PAST MEDICAL & SURGICAL HISTORY:  No pertinent past medical history  No significant past surgical history    Social Hx: No smoking, EtOH or drugs     FAMILY HISTORY:  FHx: skin cancer (Grandparent)    Family history of CVA (Grandparent)    FH: myocardial infarction (Grandparent)    Allergies  Motrin (Unknown)  Carrots (Unknown)  amoxicillin (Unknown)  Nuts (Unknown)  penicillin (Unknown)    Antibiotics:  MEDICATIONS  (STANDING):  influenza   Vaccine 0.5 milliLiter(s) IntraMuscular once  thiamine IVPB 500 milliGRAM(s) IV Intermittent daily    MEDICATIONS  (PRN):  acetaminophen     Tablet .. 650 milliGRAM(s) Oral every 6 hours PRN Temp greater or equal to 38C (100.4F), Mild Pain (1 - 3)  aluminum hydroxide/magnesium hydroxide/simethicone Suspension 30 milliLiter(s) Oral every 4 hours PRN Dyspepsia  LORazepam   Injectable 0.5 milliGRAM(s) IV Push every 30 minutes PRN for ciwa > 6  melatonin 3 milliGRAM(s) Oral at bedtime PRN Insomnia  ondansetron Injectable 4 milliGRAM(s) IV Push every 8 hours PRN Nausea and/or Vomiting     REVIEW OF SYSTEMS:  CONSTITUTIONAL:  No Fever or chills  HEENT:  No diplopia or blurred vision.  No sore throat or runny nose.  CARDIOVASCULAR:  No chest pain or SOB.  RESPIRATORY:  No cough, shortness of breath, PND or orthopnea.  GASTROINTESTINAL:  No nausea, vomiting or diarrhea.  GENITOURINARY:  No dysuria, frequency or urgency. No Blood in urine  MUSCULOSKELETAL:  no joint aches, no muscle pain  SKIN:  No change in skin, hair or nails.    Physical Exam:  Vital Signs Last 24 Hrs  T(C): 38.6 (25 Oct 2023 10:59), Max: 38.6 (25 Oct 2023 10:59)  T(F): 101.5 (25 Oct 2023 10:59), Max: 101.5 (25 Oct 2023 10:59)  HR: 118 (25 Oct 2023 05:00) (95 - 118)  BP: 100/50 (25 Oct 2023 05:00) (100/50 - 109/54)  BP(mean): --  RR: 16 (25 Oct 2023 05:00) (13 - 20)  SpO2: 94% (25 Oct 2023 05:00) (94% - 100%)  Parameters below as of 25 Oct 2023 05:00  Patient On (Oxygen Delivery Method): nasal cannula  O2 Flow (L/min): 3  GEN: NAD  HEENT: normocephalic and atraumatic. EOMI. PERRL.    NECK: Supple.  No lymphadenopathy   LUNGS: Clear to auscultation.  HEART: Regular rate and rhythm without murmur.  ABDOMEN: Soft, nontender, and nondistended.  Positive bowel sounds.    : No CVA tenderness  EXTREMITIES: Without edema.  NEUROLOGIC: grossly intact.  PSYCHIATRIC: lethargic but awake and alert with stimuli  SKIN: No rash     Labs:  10-25    145  |  111<H>  |  5<L>  ----------------------------<  108<H>  3.2<L>   |  31  |  0.77    Ca    8.8      25 Oct 2023 07:50  Phos  2.1     10-25  Mg     2.2     10-25    TPro  5.9<L>  /  Alb  3.1<L>  /  TBili  1.0  /  DBili  x   /  AST  14<L>  /  ALT  26  /  AlkPhos  42  10-25                        13.7   5.78  )-----------( 171      ( 25 Oct 2023 07:50 )             39.2     Urinalysis Basic - ( 25 Oct 2023 07:50 )    Color: x / Appearance: x / SG: x / pH: x  Gluc: 108 mg/dL / Ketone: x  / Bili: x / Urobili: x   Blood: x / Protein: x / Nitrite: x   Leuk Esterase: x / RBC: x / WBC x   Sq Epi: x / Non Sq Epi: x / Bacteria: x    LIVER FUNCTIONS - ( 25 Oct 2023 07:50 )  Alb: 3.1 g/dL / Pro: 5.9 g/dL / ALK PHOS: 42 U/L / ALT: 26 U/L / AST: 14 U/L / GGT: x           CARDIAC MARKERS ( 24 Oct 2023 11:25 )  x     / x     / 84 U/L / x     / x        All imaging and other data have been reviewed.  < from: Xray Chest 1 View- PORTABLE-Urgent (10.24.23 @ 06:41) >  IMPRESSION: Limited inspirationaccentuates some interstitial markings.   No focal infiltrate. Heart is within normal limits in its transthoracic   diameter. Regional osseous structures appropriate for age. Some distended   bowel loops noted.    Assessment and Plan:   22 y/o man with no significant PMH presents to ED via EMS for vomiting and altered mental status. Patient lethargic but answering questions, mother at the bedside helping with the history.   Mother discovered that pt takes inositol supplement, phenibut powder, F-Phenibut powder, l-theanine, and vitamin D. Looks like antimuscarinic/anticholinergic toxicity given tachycardia, mydriasis, dry mucosa and axilla, dazed appearance, and urinary retention.   Also fever could be from the drug use, no sign of infection at this time except for fever, will do fever work up but will watch off antibiotics for now unless hemodynamically unstable.     # Fever  # Lethargy  # Vomiting    - Will do blood cultures x 2   - UA negative   - CXR negative   - Will do RVP  - Will repeat CXR if fever continues   - Will watch off antibiotics     Thank you for courtesy of this consult.     Will follow.  Discussed with the primary team and pt's mother     Heather Mann MD  Division of Infectious Diseases   Please call ID service at [phone] with any question.    75 minutes spent on total encounter assessing patient, examination, chart review, counseling and coordinating care by the attending physician/nurse/care manager.  
Date/Time Patient Seen:  		  Referring MD:   Data Reviewed	       Patient is a 23y old  Male who presents with a chief complaint of metabolic encephalopathy, nausea/vomiting (24 Oct 2023 14:20)      Subjective/HPI   History of Present Illness:   22 y/o M with no significant PMHx presents to ED via EMS for vomiting and altered mental status. Patient lethargic and unable to participate in HPI/ROS, collateral information obtained from family at bedside. As per mother, patient awoke her around 4:00/4:30 Am stating he was not feeling well. Mother states patient proceeded to have 4-5 episodes of non-bloody vomitus. Patient had disclosed he had drank 6-7 cans of hard seltzer earlier in the night. Mother states patient had HR in 140s and she decided to call EMS to bring him to the hospital for further evaluation. Upon arrival to ED, patient somnolent, awakens to sternal rub and loud noises, but not responding questions and falls back asleep.    In ED, patient with negative Utox, negative EtOH level. Patient received pepcid 20mg IV x1, and NS bolus x2, then continued on maintenance IVF. Patient had CT head and CTA head and neck showing unremarkable CT head and no hemodynamic significant narrowing within the neck. No proximal large vessel occlusion intracranially. Case discussed with Toxicology fellow- States patient's mom went back home, and she discovered the patient also takes inositol supplement, phenibut powder, F-Phenibut powder, l-theanine, and vitamin D. However, patient's symptoms more suggestive of with antimuscarinic/anticholinergic toxicity given tachycardia, mydriasis, dry mucosa and axilla, dazed appearance, and urinary retention. Recommend supportive care and observation at this time.    PAST MEDICAL & SURGICAL HISTORY:  No pertinent past medical history    No significant past surgical history    PAST SURGICAL HISTORY:  No significant past surgical history.     FAMILY HISTORY:  Grandparent  Still living? Unknown  Family history of CVA, Age at diagnosis: Age Unknown  FH: myocardial infarction, Age at diagnosis: Age Unknown  FHx: skin cancer, Age at diagnosis: Age Unknown.     Social History:  · Substance use	Unable to obtain  · Social History (marital status, living situation, occupation, and sexual history)	Single, just graduated college from [employer], lives with mother. Mother denies smoking history, occasional EtOH use     Tobacco Screening:  · Core Measure Site	Yes  · Has the patient used tobacco in the past 30 days?	Unable to assess due to patient's cognitive impairment    Risk Assessment:    Present on Admission:  Deep Venous Thrombosis	no  Pulmonary Embolus	no     HIV Screening:  · In accordance with [place] State law, we offer every patient who comes to our ED an HIV test. Would you like to be tested today?	Unable to answer due to medical condition/unresponsive/etc...        Medication list         MEDICATIONS  (STANDING):  dextrose 5% + sodium chloride 0.45%. 1000 milliLiter(s) (75 mL/Hr) IV Continuous <Continuous>    MEDICATIONS  (PRN):  aluminum hydroxide/magnesium hydroxide/simethicone Suspension 30 milliLiter(s) Oral every 4 hours PRN Dyspepsia  melatonin 3 milliGRAM(s) Oral at bedtime PRN Insomnia  ondansetron Injectable 4 milliGRAM(s) IV Push every 8 hours PRN Nausea and/or Vomiting         Vitals log        ICU Vital Signs Last 24 Hrs  T(C): 36.5 (24 Oct 2023 13:18), Max: 36.8 (24 Oct 2023 05:40)  T(F): 97.7 (24 Oct 2023 13:18), Max: 98.2 (24 Oct 2023 05:40)  HR: 105 (24 Oct 2023 16:50) (105 - 159)  BP: 105/52 (24 Oct 2023 16:50) (104/54 - 120/59)  BP(mean): --  ABP: --  ABP(mean): --  RR: 20 (24 Oct 2023 16:50) (20 - 20)  SpO2: 100% (24 Oct 2023 16:50) (94% - 100%)    O2 Parameters below as of 24 Oct 2023 16:50  Patient On (Oxygen Delivery Method): nasal cannula  O2 Flow (L/min): 2               Input and Output:  I&O's Detail      Lab Data                        14.7   10.58 )-----------( 187      ( 24 Oct 2023 06:45 )             42.1     10-24    146<H>  |  111<H>  |  11  ----------------------------<  105<H>  3.9   |  28  |  0.84    Ca    8.1<L>      24 Oct 2023 06:45  Mg     2.0     10-24    TPro  6.4  /  Alb  3.4  /  TBili  0.9  /  DBili  x   /  AST  16  /  ALT  32  /  AlkPhos  45  10-24      CARDIAC MARKERS ( 24 Oct 2023 11:25 )  x     / x     / 84 U/L / x     / x            Review of Systems	  ams      Objective     Physical Examination    on RA  heart s1s2  lung dc BS  head nc      Pertinent Lab findings & Imaging      Singleton:  NO   Adequate UO     I&O's Detail           Discussed with:     Cultures:	        Radiology    ACC: [id number] EXAM:  CT ANGIO NECK STROKE PROTCL IC   ORDERED BY: NIRAJ ACEVES     ACC: [id number] EXAM:  CT ANGIO BRAIN STROKE PROTC IC   ORDERED BY: NIRAJ ACEVES     PROCEDURE DATE:  10/24/2023          INTERPRETATION:  CT ANGIO HEAD STROKE PROTOCOL, CT ANGIO NECK STROKE   PROTOCOL    Clinical information: ams    TECHNIQUE:  CT angiography of the neck and brain was performed during the dynamic   administration of intravenous contrast.  MIP reconstructions were   performed and reviewed. Multiplanar reformations were obtained.  Contrast administered: 90 cc Omipaque 350.  Contrast discarded: 10 cc.    FINDINGS:  NECK  RIGHT CAROTID CIRCULATION:  Evaluation of the right carotid circulation demonstrates no hemodynamic   significant narrowing utilizing a distal reference.    LEFT CAROTID CIRCULATION:  Evaluation of the left carotid circulation demonstrates no hemodynamic   significant narrowing utilizing a distal reference.    VERTEBRAL ARTERIES:  Evaluation of the vertebral arteries reveals no evidence of a vertebral   artery occlusion or dissection.    BRAIN  ANTERIOR CIRCULATION:  Distal internal carotid arteries are patent.  Anterior cerebral arteries are patent.  Middle cerebral arteries are patent.    POSTERIOR CIRCULATION:  Distal vertebral arteries are patent. Bilateral posterior inferior   cerebellar arteries are seen.  Basilar artery is patent. Proximal superior cerebellar arteries are patent  Posterior cerebral arteries are patent.    OTHER:  Moderate left and mild right maxillary sinus mucosal thickening. Mild   associated fluid on the left. Mild mucosal thickening within the ethmoid   sinuses.    IMPRESSION:  NO HEMODYNAMIC SIGNIFICANT NARROWING WITHIN THE NECK.  NO PROXIMAL LARGE VESSEL OCCLUSION INTRACRANIALLY.    --- End of Report ---            ARPITA DSOUZA MD; Attending Radiologist  This document has been electronically signed. Oct 24 2023 12:45PM                          
MEDICAL TOXICOLOGY CONSULT    HPI: 24 yo M, reportedly healthy, who presents for altered mental status. Toxicology consulted for possible xenobiotic ingestion. History provided by the mother. Patient was reportedly at baseline yesterday. Around 4 am, patient was awake and told mom he was not feeling well and emesis. Patient reported drinking approximately 7 cans of   [employer] (~4.2% alcohol). No other acute ingestion reported by patient. The patient is unable to give history at this time due to obtundation.     When mom went back home, she discovered the patient also takes inositol supplement, phenibut powder, F-Phenibut powder, l-theanine, and vitamin D. Medications accessible at home include apixaban, nebivolol, and common OTC medications including diphenhydramine, apap.    ONSET / TIME of exposure(s): unknown    QUANTITY of exposure(s): unknown    ROUTE of exposure: UNKNOWN    CONTEXT of exposure: at home    ASSOCIATED symptoms: emesis, altered mental status    PAST MEDICAL & SURGICAL HISTORY: n/a      MEDICATION HISTORY:  dextrose 5% + sodium chloride 0.45%. 1000 milliLiter(s) IV Continuous <Continuous>      FAMILY HISTORY: n/a      REVIEW OF SYSTEMS:  unable to perform due to intoxication, dementia, or illness      Vital Signs Last 24 Hrs  T(C): 36.5 (24 Oct 2023 13:18), Max: 36.8 (24 Oct 2023 05:40)  T(F): 97.7 (24 Oct 2023 13:18), Max: 98.2 (24 Oct 2023 05:40)  HR: 107 (24 Oct 2023 13:18) (107 - 159)  BP: 104/54 (24 Oct 2023 13:18) (104/54 - 120/59)  BP(mean): --  RR: 20 (24 Oct 2023 13:18) (20 - 20)  SpO2: 96% (24 Oct 2023 13:18) (94% - 98%)    Parameters below as of 24 Oct 2023 13:18  Patient On (Oxygen Delivery Method): room air        SIGNIFICANT LABORATORY STUDIES:                        14.7   10.58 )-----------( 187      ( 24 Oct 2023 06:45 )             42.1       10-24    146<H>  |  111<H>  |  11  ----------------------------<  105<H>  3.9   |  28  |  0.84    Ca    8.1<L>      24 Oct 2023 06:45  Mg     2.0     10-24    TPro  6.4  /  Alb  3.4  /  TBili  0.9  /  DBili  x   /  AST  16  /  ALT  32  /  AlkPhos  45  10          Urinalysis Basic - ( 24 Oct 2023 11:45 )    Color: Yellow / Appearance: Clear / S.014 / pH: x  Gluc: x / Ketone: Negative mg/dL  / Bili: Negative / Urobili: 1.0 mg/dL   Blood: x / Protein: Negative mg/dL / Nitrite: Negative   Leuk Esterase: Negative / RBC: x / WBC x   Sq Epi: x / Non Sq Epi: x / Bacteria: x        Anion Gap: -- 10-24 @ 11:25  CK: 84 10-24 @ 11:25  Troponin:  --  10-24 @ 11:25  Pro-BNP:  --  10-24 @ 11:25  VBG:  --  10-24 @ 11:25  Carboxyhemoglobin %:  --  10-24 @ 11:25  Methemoglobin %:  --  10-24 @ 11:25  Osmolality Serum:  --  10-24 @ 11:25  Aspirin Level: <1.7<L>  10-24 @ 11:25  Acetaminophen Level:  <2<L>  10-24 @ 11:25  Ethanol Level:  --  10-24 @ 11:25  Digoxin Level:  --  10-24 @ 11:25  Phenytoin Level:  --  10-24 @ 11:25  Carbamazepine level:  --  10-24 @ 11:25  Lamotrigine level:  --  10-24 @ 11:25  Anion Gap: 7 10-24 @ 06:45  CK: -- 10-24 @ 06:45  Troponin:  --  10-24 @ 06:45  Pro-BNP:  --  10-24 @ 06:45  VBG:  --  10-24 @ :45  Carboxyhemoglobin %:  --  10-24 @ 06:45  Methemoglobin %:  --  10-24 @ 06:45  Osmolality Serum:  --  10-24 @ 06:45  Aspirin Level: --  10-24 @ 06:45  Acetaminophen Level:  --  10-24 @ 06:45  Ethanol Level:  --  10-24 @ 06:45  Digoxin Level:  --  10-24 @ 06:45  Phenytoin Level:  --  10-24 @ 06:45  Carbamazepine level:  --  10-24 @ 06:45  Lamotrigine level:  --  10-24 @ 06:45

## 2023-10-25 NOTE — PROGRESS NOTE ADULT - PROBLEM SELECTOR PLAN 1
-Possibly due to metabolic encephalopathy from toxin ingestion, much improved  -Toxicology consulted, appreciate recs  -CT head and CTA head/neck negative for acute process  -Encourage zenon UNGER d/fallon'ed for TOV

## 2023-10-25 NOTE — SBIRT NOTE ADULT - NSSBIRTUNABLESCROTHER_GEN_A_CORE
Pt has used marijuana and alcohol in the past per his mother. She denies substance use to her knowledge.

## 2023-10-25 NOTE — CARE COORDINATION ASSESSMENT. - NSCAREPROVIDERS_GEN_ALL_CORE_FT
CARE PROVIDERS:  Accepting Physician: Ricardo Thakkar  Admitting: Ricardo Thakkar  Attending: Ricardo Thakkar  Consultant: Mauricio Kendall  Consultant: Nilson Gonzales  Consultant: Heather Mann  Consultant: Vinay Thomas  ED Attending: Shweta Adams  ED Nurse: Naila Cardona  Nurse: Aamir Reyna  Nurse: Tony Davenport  Ordered: ServiceAccount, SCMMLM  Ordered: ADM, User  Override: Christian Palencia  Override: Tony Davenport  Override: Aamir Reyna  Override: Letitia Heard  Override: Prema Zavala  Override: Son Cormier  Override: Desiree Bell  Override: Lopez Benitez  Override: Diana Lackey  PCA/Nursing Assistant: Letitia Heard  Primary Team: Ricardo Thakkar  Primary Team: Dianna Ahmadi  Registered Dietitian: Nina Joshua  : Trista Jimenez  : Sarah Pike  Team: PLV  Hospitalists, Team  UR// Supp. Assoc.: Anitha Bryant

## 2023-10-26 ENCOUNTER — TRANSCRIPTION ENCOUNTER (OUTPATIENT)
Age: 23
End: 2023-10-26

## 2023-10-26 VITALS
OXYGEN SATURATION: 96 % | DIASTOLIC BLOOD PRESSURE: 68 MMHG | HEART RATE: 87 BPM | SYSTOLIC BLOOD PRESSURE: 104 MMHG | TEMPERATURE: 99 F | RESPIRATION RATE: 17 BRPM

## 2023-10-26 LAB
ANION GAP SERPL CALC-SCNC: 6 MMOL/L — SIGNIFICANT CHANGE UP (ref 5–17)
ANION GAP SERPL CALC-SCNC: 6 MMOL/L — SIGNIFICANT CHANGE UP (ref 5–17)
BUN SERPL-MCNC: 9 MG/DL — SIGNIFICANT CHANGE UP (ref 7–23)
BUN SERPL-MCNC: 9 MG/DL — SIGNIFICANT CHANGE UP (ref 7–23)
CALCIUM SERPL-MCNC: 8.4 MG/DL — LOW (ref 8.5–10.1)
CALCIUM SERPL-MCNC: 8.4 MG/DL — LOW (ref 8.5–10.1)
CHLORIDE SERPL-SCNC: 111 MMOL/L — HIGH (ref 96–108)
CHLORIDE SERPL-SCNC: 111 MMOL/L — HIGH (ref 96–108)
CO2 SERPL-SCNC: 29 MMOL/L — SIGNIFICANT CHANGE UP (ref 22–31)
CO2 SERPL-SCNC: 29 MMOL/L — SIGNIFICANT CHANGE UP (ref 22–31)
CREAT SERPL-MCNC: 0.78 MG/DL — SIGNIFICANT CHANGE UP (ref 0.5–1.3)
CREAT SERPL-MCNC: 0.78 MG/DL — SIGNIFICANT CHANGE UP (ref 0.5–1.3)
EGFR: 130 ML/MIN/1.73M2 — SIGNIFICANT CHANGE UP
EGFR: 130 ML/MIN/1.73M2 — SIGNIFICANT CHANGE UP
GLUCOSE SERPL-MCNC: 105 MG/DL — HIGH (ref 70–99)
GLUCOSE SERPL-MCNC: 105 MG/DL — HIGH (ref 70–99)
HCT VFR BLD CALC: 40.2 % — SIGNIFICANT CHANGE UP (ref 39–50)
HCT VFR BLD CALC: 40.2 % — SIGNIFICANT CHANGE UP (ref 39–50)
HGB BLD-MCNC: 13.7 G/DL — SIGNIFICANT CHANGE UP (ref 13–17)
HGB BLD-MCNC: 13.7 G/DL — SIGNIFICANT CHANGE UP (ref 13–17)
MCHC RBC-ENTMCNC: 28.3 PG — SIGNIFICANT CHANGE UP (ref 27–34)
MCHC RBC-ENTMCNC: 28.3 PG — SIGNIFICANT CHANGE UP (ref 27–34)
MCHC RBC-ENTMCNC: 34.1 GM/DL — SIGNIFICANT CHANGE UP (ref 32–36)
MCHC RBC-ENTMCNC: 34.1 GM/DL — SIGNIFICANT CHANGE UP (ref 32–36)
MCV RBC AUTO: 83.1 FL — SIGNIFICANT CHANGE UP (ref 80–100)
MCV RBC AUTO: 83.1 FL — SIGNIFICANT CHANGE UP (ref 80–100)
NRBC # BLD: 0 /100 WBCS — SIGNIFICANT CHANGE UP (ref 0–0)
NRBC # BLD: 0 /100 WBCS — SIGNIFICANT CHANGE UP (ref 0–0)
PLATELET # BLD AUTO: 167 K/UL — SIGNIFICANT CHANGE UP (ref 150–400)
PLATELET # BLD AUTO: 167 K/UL — SIGNIFICANT CHANGE UP (ref 150–400)
POTASSIUM SERPL-MCNC: 3.3 MMOL/L — LOW (ref 3.5–5.3)
POTASSIUM SERPL-MCNC: 3.3 MMOL/L — LOW (ref 3.5–5.3)
POTASSIUM SERPL-SCNC: 3.3 MMOL/L — LOW (ref 3.5–5.3)
POTASSIUM SERPL-SCNC: 3.3 MMOL/L — LOW (ref 3.5–5.3)
RBC # BLD: 4.84 M/UL — SIGNIFICANT CHANGE UP (ref 4.2–5.8)
RBC # BLD: 4.84 M/UL — SIGNIFICANT CHANGE UP (ref 4.2–5.8)
RBC # FLD: 12.5 % — SIGNIFICANT CHANGE UP (ref 10.3–14.5)
RBC # FLD: 12.5 % — SIGNIFICANT CHANGE UP (ref 10.3–14.5)
SODIUM SERPL-SCNC: 146 MMOL/L — HIGH (ref 135–145)
SODIUM SERPL-SCNC: 146 MMOL/L — HIGH (ref 135–145)
WBC # BLD: 5.56 K/UL — SIGNIFICANT CHANGE UP (ref 3.8–10.5)
WBC # BLD: 5.56 K/UL — SIGNIFICANT CHANGE UP (ref 3.8–10.5)
WBC # FLD AUTO: 5.56 K/UL — SIGNIFICANT CHANGE UP (ref 3.8–10.5)
WBC # FLD AUTO: 5.56 K/UL — SIGNIFICANT CHANGE UP (ref 3.8–10.5)

## 2023-10-26 PROCEDURE — 80048 BASIC METABOLIC PNL TOTAL CA: CPT

## 2023-10-26 PROCEDURE — 87040 BLOOD CULTURE FOR BACTERIA: CPT

## 2023-10-26 PROCEDURE — 82550 ASSAY OF CK (CPK): CPT

## 2023-10-26 PROCEDURE — 36415 COLL VENOUS BLD VENIPUNCTURE: CPT

## 2023-10-26 PROCEDURE — 84443 ASSAY THYROID STIM HORMONE: CPT

## 2023-10-26 PROCEDURE — 70450 CT HEAD/BRAIN W/O DYE: CPT | Mod: MA

## 2023-10-26 PROCEDURE — 71045 X-RAY EXAM CHEST 1 VIEW: CPT

## 2023-10-26 PROCEDURE — 83605 ASSAY OF LACTIC ACID: CPT

## 2023-10-26 PROCEDURE — 81003 URINALYSIS AUTO W/O SCOPE: CPT

## 2023-10-26 PROCEDURE — 93005 ELECTROCARDIOGRAM TRACING: CPT

## 2023-10-26 PROCEDURE — 70498 CT ANGIOGRAPHY NECK: CPT | Mod: MA

## 2023-10-26 PROCEDURE — 80053 COMPREHEN METABOLIC PANEL: CPT

## 2023-10-26 PROCEDURE — 83735 ASSAY OF MAGNESIUM: CPT

## 2023-10-26 PROCEDURE — 80155 DRUG ASSAY CAFFEINE: CPT

## 2023-10-26 PROCEDURE — 96374 THER/PROPH/DIAG INJ IV PUSH: CPT

## 2023-10-26 PROCEDURE — 99285 EMERGENCY DEPT VISIT HI MDM: CPT | Mod: 25

## 2023-10-26 PROCEDURE — 0225U NFCT DS DNA&RNA 21 SARSCOV2: CPT

## 2023-10-26 PROCEDURE — 83690 ASSAY OF LIPASE: CPT

## 2023-10-26 PROCEDURE — 84484 ASSAY OF TROPONIN QUANT: CPT

## 2023-10-26 PROCEDURE — 99232 SBSQ HOSP IP/OBS MODERATE 35: CPT

## 2023-10-26 PROCEDURE — 85027 COMPLETE CBC AUTOMATED: CPT

## 2023-10-26 PROCEDURE — 70496 CT ANGIOGRAPHY HEAD: CPT | Mod: MA

## 2023-10-26 PROCEDURE — 84100 ASSAY OF PHOSPHORUS: CPT

## 2023-10-26 PROCEDURE — 80307 DRUG TEST PRSMV CHEM ANLYZR: CPT

## 2023-10-26 PROCEDURE — 85025 COMPLETE CBC W/AUTO DIFF WBC: CPT

## 2023-10-26 PROCEDURE — 99239 HOSP IP/OBS DSCHRG MGMT >30: CPT

## 2023-10-26 RX ORDER — POTASSIUM CHLORIDE 20 MEQ
40 PACKET (EA) ORAL ONCE
Refills: 0 | Status: COMPLETED | OUTPATIENT
Start: 2023-10-26 | End: 2023-10-26

## 2023-10-26 RX ADMIN — Medication 650 MILLIGRAM(S): at 06:30

## 2023-10-26 RX ADMIN — Medication 40 MILLIEQUIVALENT(S): at 11:58

## 2023-10-26 RX ADMIN — SODIUM CHLORIDE 75 MILLILITER(S): 9 INJECTION, SOLUTION INTRAVENOUS at 03:00

## 2023-10-26 NOTE — PROGRESS NOTE ADULT - ASSESSMENT
24 y/o M with no significant PMHx presents to ED via EMS for vomiting and altered mental status. Patient lethargic and unable to participate in HPI/ROS, collateral information obtained from family at bedside. As per mother, patient awoke her around 4:00/4:30 Am stating he was not feeling well.    ams  amarjit  n/v  etoh use    PAT overnight  vs noted    PSYCH cx  monitor VS and Sat  I and O  hydration  anti emetics  ciwa monitoring  Ativan low dose PRN as per ciwa triggers  spoke with mom - dad  CNS imaging reviewed  monitor mentation  assist with needs  HOB elev  oral hygiene  skin care
24 y/o M with no significant PMHx presents to ED via EMS for vomiting and altered mental status. Patient lethargic and unable to participate in HPI/ROS, collateral information obtained from family at bedside. As per mother, patient awoke her around 4:00/4:30 Am stating he was not feeling well.    ams  amarjit  n/v  etoh use    fever  covid pos  ID eval noted  vs noted    monitor VS and Sat  I and O  hydration  anti emetics  ciwa monitoring  Ativan low dose PRN as per ciwa triggers  spoke with mom - dad  CNS imaging reviewed  monitor mentation  assist with needs  HOB elev  oral hygiene  skin care  
22 y/o M with no significant PMHx presents to ED via EMS for vomiting and altered mental status being admitted for metabolic encephalopathy, possible toxin overdose.

## 2023-10-26 NOTE — PROGRESS NOTE ADULT - SUBJECTIVE AND OBJECTIVE BOX
[facility]  INFECTIOUS DISEASES   [address] [place] [address]  Tel: [phone]     Fax: [phone]  ========================================================  MD Boris Gleason Kaushal, MD Cho, Michelle, MD Sunjit, Jaspal, MD  ========================================================    [id number]  DIANE SALEH     Follow up:  metabolic encephalopathy, COVID    Still lethargic but oriented and answering questions.   No more fever this morning. No SOB or cough, COVID test positive.     PAST MEDICAL & SURGICAL HISTORY:  No pertinent past medical history  No significant past surgical history    Social Hx: No smoking, EtOH or drugs     FAMILY HISTORY:  FHx: skin cancer (Grandparent)    Family history of CVA (Grandparent)    FH: myocardial infarction (Grandparent)    Allergies  Motrin (Unknown)  Carrots (Unknown)  amoxicillin (Unknown)  Nuts (Unknown)  penicillin (Unknown)    Antibiotics:  MEDICATIONS  (STANDING):  influenza   Vaccine 0.5 milliLiter(s) IntraMuscular once  thiamine IVPB 500 milliGRAM(s) IV Intermittent daily    MEDICATIONS  (PRN):  acetaminophen     Tablet .. 650 milliGRAM(s) Oral every 6 hours PRN Temp greater or equal to 38C (100.4F), Mild Pain (1 - 3)  aluminum hydroxide/magnesium hydroxide/simethicone Suspension 30 milliLiter(s) Oral every 4 hours PRN Dyspepsia  LORazepam   Injectable 0.5 milliGRAM(s) IV Push every 30 minutes PRN for ciwa > 6  melatonin 3 milliGRAM(s) Oral at bedtime PRN Insomnia  ondansetron Injectable 4 milliGRAM(s) IV Push every 8 hours PRN Nausea and/or Vomiting     REVIEW OF SYSTEMS:  CONSTITUTIONAL:  No Fever or chills  HEENT:  No diplopia or blurred vision.  No sore throat or runny nose.  CARDIOVASCULAR:  No chest pain or SOB.  RESPIRATORY:  No cough, shortness of breath, PND or orthopnea.  GASTROINTESTINAL:  No nausea, vomiting or diarrhea.  GENITOURINARY:  No dysuria, frequency or urgency. No Blood in urine  MUSCULOSKELETAL:  no joint aches, no muscle pain  SKIN:  No change in skin, hair or nails.    Physical Exam:  Vital Signs Last 24 Hrs  T(C): 38.1 (26 Oct 2023 04:54), Max: 38.6 (25 Oct 2023 10:59)  T(F): 100.5 (26 Oct 2023 04:54), Max: 101.5 (25 Oct 2023 10:59)  HR: 107 (26 Oct 2023 04:54) (107 - 114)  BP: 102/65 (26 Oct 2023 04:54) (101/50 - 102/65)  RR: 17 (26 Oct 2023 04:54) (17 - 18)  SpO2: 94% (26 Oct 2023 04:54) (93% - 94%)  Parameters below as of 26 Oct 2023 04:54  Patient On (Oxygen Delivery Method): room air  GEN: NAD  HEENT: normocephalic and atraumatic. EOMI. PERRL.    NECK: Supple.  No lymphadenopathy   LUNGS: Clear to auscultation.  HEART: Regular rate and rhythm without murmur.  ABDOMEN: Soft, nontender, and nondistended.  Positive bowel sounds.    : No CVA tenderness  EXTREMITIES: Without edema.  NEUROLOGIC: grossly intact.  PSYCHIATRIC: lethargic but awake and alert with stimuli  SKIN: No rash     Labs:                        13.7   5.56  )-----------( 167      ( 26 Oct 2023 08:03 )             40.2     10-26    146<H>  |  111<H>  |  9   ----------------------------<  105<H>  3.3<L>   |  29  |  0.78    Ca    8.4<L>      26 Oct 2023 08:03  Phos  2.1     10-25  Mg     2.2     10-25    TPro  5.9<L>  /  Alb  3.1<L>  /  TBili  1.0  /  DBili  x   /  AST  14<L>  /  ALT  26  /  AlkPhos  42  10-25    WBC Count: 5.56 K/uL (10-26-23 @ 08:03)  WBC Count: 5.78 K/uL (10-25-23 @ 07:50)  WBC Count: 10.58 K/uL (10-24-23 @ 06:45)    Creatinine: 0.78 mg/dL (10-26-23 @ 08:03)  Creatinine: 0.77 mg/dL (10-25-23 @ 07:50)  Creatinine: 0.84 mg/dL (10-24-23 @ 06:45)     SARS-CoV-2: Detected (10-25-23 @ 14:25)    All imaging and other data have been reviewed.  < from: Xray Chest 1 View- PORTABLE-Urgent (10.24.23 @ 06:41) >  IMPRESSION: Limited inspirationaccentuates some interstitial markings.   No focal infiltrate. Heart is within normal limits in its transthoracic   diameter. Regional osseous structures appropriate for age. Some distended   bowel loops noted.    Assessment and Plan:   24 y/o man with no significant PMH presents to ED via EMS for vomiting and altered mental status. Patient lethargic but answering questions, mother at the bedside helping with the history.   Mother discovered that pt takes inositol supplement, phenibut powder, F-Phenibut powder, l-theanine, and vitamin D. Looks like antimuscarinic/anticholinergic toxicity given tachycardia, mydriasis, dry mucosa and axilla, dazed appearance, and urinary retention.   Fever could be from the drug use, no sign of infection at this time except for positive COVID test but as per family they had covid 2-3 weeks ago but pt didn't get tested for COVID but   had cough for few weeks.     As per     # Fever  # Lethargy  # Vomiting    - Blood cultures x 2 are pending   - UA negative   - CXR negative with some interstitial markings   - RVP with COVID, no treatment for now   - Will watch off antibiotics   - O2 as needed, AC as per protocol  Family wants to take him home, from ID stand point can be discharged. Can come back to ED if worsening or new symptoms or cultures come back positive.     Will follow.  Discussed with the primary team and pt's parents.     Heather Mann MD  Division of Infectious Diseases   Please call ID service at [phone] with any question.    50 minutes spent on total encounter assessing patient, examination, chart review, counseling and coordinating care by the attending physician/nurse/care manager.   [facility]  INFECTIOUS DISEASES   [address] [place] [address]  Tel: [phone]     Fax: [phone]  ========================================================  MD Boris Gleason Kaushal, MD Cho, Michelle, MD Sunjit, Jaspal, MD  ========================================================    [id number]  DIANE SALEH     Follow up:  metabolic encephalopathy, COVID    Still lethargic but oriented and answering questions.   No more fever this morning. No SOB or cough, COVID test positive.     PAST MEDICAL & SURGICAL HISTORY:  No pertinent past medical history  No significant past surgical history    Social Hx: No smoking, EtOH or drugs     FAMILY HISTORY:  FHx: skin cancer (Grandparent)    Family history of CVA (Grandparent)    FH: myocardial infarction (Grandparent)    Allergies  Motrin (Unknown)  Carrots (Unknown)  amoxicillin (Unknown)  Nuts (Unknown)  penicillin (Unknown)    Antibiotics:  MEDICATIONS  (STANDING):  influenza   Vaccine 0.5 milliLiter(s) IntraMuscular once  thiamine IVPB 500 milliGRAM(s) IV Intermittent daily    MEDICATIONS  (PRN):  acetaminophen     Tablet .. 650 milliGRAM(s) Oral every 6 hours PRN Temp greater or equal to 38C (100.4F), Mild Pain (1 - 3)  aluminum hydroxide/magnesium hydroxide/simethicone Suspension 30 milliLiter(s) Oral every 4 hours PRN Dyspepsia  LORazepam   Injectable 0.5 milliGRAM(s) IV Push every 30 minutes PRN for ciwa > 6  melatonin 3 milliGRAM(s) Oral at bedtime PRN Insomnia  ondansetron Injectable 4 milliGRAM(s) IV Push every 8 hours PRN Nausea and/or Vomiting     REVIEW OF SYSTEMS:  CONSTITUTIONAL:  No Fever or chills  HEENT:  No diplopia or blurred vision.  No sore throat or runny nose.  CARDIOVASCULAR:  No chest pain or SOB.  RESPIRATORY:  No cough, shortness of breath, PND or orthopnea.  GASTROINTESTINAL:  No nausea, vomiting or diarrhea.  GENITOURINARY:  No dysuria, frequency or urgency. No Blood in urine  MUSCULOSKELETAL:  no joint aches, no muscle pain  SKIN:  No change in skin, hair or nails.    Physical Exam:  Vital Signs Last 24 Hrs  T(C): 38.1 (26 Oct 2023 04:54), Max: 38.6 (25 Oct 2023 10:59)  T(F): 100.5 (26 Oct 2023 04:54), Max: 101.5 (25 Oct 2023 10:59)  HR: 107 (26 Oct 2023 04:54) (107 - 114)  BP: 102/65 (26 Oct 2023 04:54) (101/50 - 102/65)  RR: 17 (26 Oct 2023 04:54) (17 - 18)  SpO2: 94% (26 Oct 2023 04:54) (93% - 94%)  Parameters below as of 26 Oct 2023 04:54  Patient On (Oxygen Delivery Method): room air  GEN: NAD  HEENT: normocephalic and atraumatic. EOMI. PERRL.    NECK: Supple.  No lymphadenopathy   LUNGS: Clear to auscultation.  HEART: Regular rate and rhythm without murmur.  ABDOMEN: Soft, nontender, and nondistended.  Positive bowel sounds.    : No CVA tenderness  EXTREMITIES: Without edema.  NEUROLOGIC: grossly intact.  PSYCHIATRIC: lethargic but awake and alert with stimuli  SKIN: No rash     Labs:                        13.7   5.56  )-----------( 167      ( 26 Oct 2023 08:03 )             40.2     10-26    146<H>  |  111<H>  |  9   ----------------------------<  105<H>  3.3<L>   |  29  |  0.78    Ca    8.4<L>      26 Oct 2023 08:03  Phos  2.1     10-25  Mg     2.2     10-25    TPro  5.9<L>  /  Alb  3.1<L>  /  TBili  1.0  /  DBili  x   /  AST  14<L>  /  ALT  26  /  AlkPhos  42  10-25    WBC Count: 5.56 K/uL (10-26-23 @ 08:03)  WBC Count: 5.78 K/uL (10-25-23 @ 07:50)  WBC Count: 10.58 K/uL (10-24-23 @ 06:45)    Creatinine: 0.78 mg/dL (10-26-23 @ 08:03)  Creatinine: 0.77 mg/dL (10-25-23 @ 07:50)  Creatinine: 0.84 mg/dL (10-24-23 @ 06:45)     SARS-CoV-2: Detected (10-25-23 @ 14:25)    All imaging and other data have been reviewed.  < from: Xray Chest 1 View- PORTABLE-Urgent (10.24.23 @ 06:41) >  IMPRESSION: Limited inspirationaccentuates some interstitial markings.   No focal infiltrate. Heart is within normal limits in its transthoracic   diameter. Regional osseous structures appropriate for age. Some distended   bowel loops noted.    Assessment and Plan:   24 y/o man with no significant PMH presents to ED via EMS for vomiting and altered mental status. Patient lethargic but answering questions, mother at the bedside helping with the history.   Mother discovered that pt takes inositol supplement, phenibut powder, F-Phenibut powder, l-theanine, and vitamin D. Looks like antimuscarinic/anticholinergic toxicity given tachycardia, mydriasis, dry mucosa and axilla, dazed appearance, and urinary retention.   Fever could be from the drug use, no sign of infection at this time except for positive COVID test but as per family they had covid 2-3 weeks ago but pt didn't get tested for COVID but   had cough for few weeks.     # Fever  # Lethargy  # Vomiting    - Blood cultures x 2 are pending   - UA negative   - CXR negative with some interstitial markings   - RVP with COVID, no treatment for now   - Will watch off antibiotics   - O2 as needed, AC as per protocol  Family wants to take him home, from ID stand point can be discharged. Can come back to ED if worsening or new symptoms or cultures come back positive.     Will follow.  Discussed with the primary team and pt's parents.     Heather Mann MD  Division of Infectious Diseases   Please call ID service at [phone] with any question.    50 minutes spent on total encounter assessing patient, examination, chart review, counseling and coordinating care by the attending physician/nurse/care manager.

## 2023-10-26 NOTE — DISCHARGE NOTE PROVIDER - NSDCCPCAREPLAN_GEN_ALL_CORE_FT
PRINCIPAL DISCHARGE DIAGNOSIS  Diagnosis: Drug intoxication  Assessment and Plan of Treatment: Your symptoms resolved. Please refrain form using any drugs without proper prescription. Please follow up with PCP within 2 weeks of discharge. If experiencing high fevers/chills, palpitations, intractable nausea/vomiting, please return to the Emergency Department.      SECONDARY DISCHARGE DIAGNOSES  Diagnosis: 2019 novel coronavirus disease (COVID-19)  Assessment and Plan of Treatment: You tested positive for COVID-19 during hospital stay. However this may be viral shedding from prior COVID infection. Monitor fever curve, continue supportive care. Stay well hydrated. If experiencing worsening SOB, please return to Emergency Department.

## 2023-10-26 NOTE — DISCHARGE NOTE NURSING/CASE MANAGEMENT/SOCIAL WORK - PATIENT PORTAL LINK FT
You can access the FollowMyHealth Patient Portal offered by [facility] by registering at the following website: [email or web address]. By joining [employer]’s FollowMyHealth portal, you will also be able to view your health information using other applications (apps) compatible with our system.

## 2023-10-26 NOTE — DISCHARGE NOTE NURSING/CASE MANAGEMENT/SOCIAL WORK - NSDCPEFALRISK_GEN_ALL_CORE
For information on Fall & Injury Prevention, visit: [email or web address] OR  [email or web address] OR  https://www.cdc.gov/steadi/patient.html

## 2023-10-26 NOTE — DISCHARGE NOTE PROVIDER - NS AS DC PROVIDER CONTACT Y/N MULTI
Yes Vital Signs Last 24 Hrs  T(C): 36.7 (07-18-18 @ 15:13)  T(F): 98 (07-18-18 @ 15:13), Max: 98 (07-18-18 @ 15:13)  HR: 61 (07-18-18 @ 17:21) (61 - 80)  BP: 124/60 (07-18-18 @ 17:21)  BP(mean): --  RR: 22 (07-18-18 @ 17:21) (19 - 22)  SpO2: 97% (07-18-18 @ 17:21) (97% - 99%)      -----------------------------------------------    GEN: A&O X 3 , NAD , comfortable, pleasant   HEENT: NCAT, PERRL, MMM, hearing intact  Neck: supple , + JVD  CVS: S1S2 , regular , No M/R/G appreciated  PULM: trace L>R rales at bases   ABD.: soft. non tender, non distended,  bowel sounds present, obese   Extrem: intact pulses , + B/L 1-2+ edema   Derm: No rash , no ecchymoses  PSYCH : normal mood,  no delusion not anxious Vital Signs Last 24 Hrs  T(C): 36.5 (08-12-18 @ 09:50)  T(F): 97.7 (08-12-18 @ 09:50), Max: 97.7 (08-12-18 @ 09:50)  HR: 90 (08-12-18 @ 13:15) (90 - 107)  BP: 139/80 (08-12-18 @ 13:15)  BP(mean): --  RR: 18 (08-12-18 @ 13:15) (18 - 22)  SpO2: 97% (08-12-18 @ 13:15) (95% - 98%)      -----------------------------------------------    GEN: A&O X 3 , NAD , comfortable, pleasant   HEENT: NCAT, PERRL, MMM, hearing intact  Neck: supple , no JVD  CVS: S1S2 , regular , No M/R/G appreciated  PULM: trace L>R rales at bases   ABD.: soft. non tender, non distended,  bowel sounds present, obese   Extrem: intact pulses ,  no significant edema now post lasix earlier today   Derm: No rash , no ecchymoses  PSYCH : normal mood,  no delusion not anxious

## 2023-10-26 NOTE — PROGRESS NOTE ADULT - REASON FOR ADMISSION
metabolic encephalopathy, nausea/vomiting

## 2023-10-26 NOTE — DISCHARGE NOTE PROVIDER - HOSPITAL COURSE
ADMISSION DATE:  10-24-23    ---  FROM ADMISSION H+P:   HPI:  24 y/o M with no significant PMHx presents to ED via EMS for vomiting and altered mental status. Patient lethargic and unable to participate in HPI/ROS, collateral information obtained from family at bedside. As per mother, patient awoke her around 4:00/4:30 Am stating he was not feeling well. Mother states patient proceeded to have 4-5 episodes of non-bloody vomitus. Patient had disclosed he had drank 6-7 cans of hard seltzer earlier in the night. Mother states patient had HR in 140s and she decided to call EMS to bring him to the hospital for further evaluation. Upon arrival to ED, patient somnolent, awakens to sternal rub and loud noises, but not responding questions and falls back asleep.    In ED, patient with negative Utox, negative EtOH level. Patient received pepcid 20mg IV x1, and NS bolus x2, then continued on maintenance IVF. Patient had CT head and CTA head and neck showing unremarkable CT head and no hemodynamic significant narrowing within the neck. No proximal large vessel occlusion intracranially. Case discussed with Toxicology fellow- States patient's mom went back home, and she discovered the patient also takes inositol supplement, phenibut powder, F-Phenibut powder, l-theanine, and vitamin D. However, patient's symptoms more suggestive of with antimuscarinic/anticholinergic toxicity given tachycardia, mydriasis, dry mucosa and axilla, dazed appearance, and urinary retention. Recommend supportive care and observation at this time.   (24 Oct 2023 14:20)      ---  HOSPITAL COURSE/PERTINENT LABS/PROCEDURES PERFORMED/PENDING TESTS:  Patient admitted to medicine for metabolic encephalopathy. Toxicology consulted, recommended supportive care. Patient spiked fever during hospital stay, unsure if withdrawal symptoms. Patient tested positive for COVID-19, blood culture pending, UA negative. Family states they had COVID 3 weeks ago, so unsure if current test is viral shedding. Patient saturating well on room air, fever improved. Family and patient eager to go home, discussed with ID and Addiction medicine, stable for discharge with close outpatient follow up.    ---  PATIENT CONDITION:  - stable    ---  PHYSICAL EXAM ON DAY OF DISCHARGE:  General: laying in bed comfortably, NAD  HEENT: NCAT, PERRL, EOMI bl, moist mucous membranes   Neck: Supple, nontender, no mass  Neurology: A&Ox3, nonfocal, CN II-XII grossly intact, sensation intact, no gait abnormalities   Respiratory: CTA B/L, No W/R/R  CV: RRR, +S1/S2, no murmurs, rubs or gallops  Abdominal: Soft, NT, ND +BSx4  Extremities: No C/C/E, + peripheral pulses  Skin: warm, dry, normal color  ---  CONSULTANTS:   Dr. Kendall (Addiction Medicine)  Toxicology team  Dr. Mann (ID)  ---  TIME SPENT:  I, the attending physician, was physically present for the key portions of the evaluation and management (E/M) service provided. The total amount of time spent reviewing the hospital notes, laboratory values, imaging findings, assessing/counseling the patient, discussing with consultant physicians, social work, nursing staff was 40 minutes

## 2023-10-26 NOTE — PROGRESS NOTE ADULT - SUBJECTIVE AND OBJECTIVE BOX
Date/Time Patient Seen:  		  Referring MD:   Data Reviewed	       Patient is a 23y old  Male who presents with a chief complaint of metabolic encephalopathy, nausea/vomiting (25 Oct 2023 12:16)      Subjective/HPI     PAST MEDICAL & SURGICAL HISTORY:  No pertinent past medical history    No significant past surgical history          Medication list         MEDICATIONS  (STANDING):  dextrose 5% + sodium chloride 0.45%. 1000 milliLiter(s) (75 mL/Hr) IV Continuous <Continuous>  influenza   Vaccine 0.5 milliLiter(s) IntraMuscular once  thiamine IVPB 500 milliGRAM(s) IV Intermittent daily    MEDICATIONS  (PRN):  acetaminophen     Tablet .. 650 milliGRAM(s) Oral every 6 hours PRN Temp greater or equal to 38C (100.4F), Mild Pain (1 - 3)  aluminum hydroxide/magnesium hydroxide/simethicone Suspension 30 milliLiter(s) Oral every 4 hours PRN Dyspepsia  LORazepam   Injectable 0.5 milliGRAM(s) IV Push every 30 minutes PRN for ciwa > 6  melatonin 3 milliGRAM(s) Oral at bedtime PRN Insomnia  ondansetron Injectable 4 milliGRAM(s) IV Push every 8 hours PRN Nausea and/or Vomiting         Vitals log        ICU Vital Signs Last 24 Hrs  T(C): 38.1 (26 Oct 2023 04:54), Max: 38.6 (25 Oct 2023 10:59)  T(F): 100.5 (26 Oct 2023 04:54), Max: 101.5 (25 Oct 2023 10:59)  HR: 107 (26 Oct 2023 04:54) (107 - 114)  BP: 102/65 (26 Oct 2023 04:54) (101/50 - 102/65)  BP(mean): --  ABP: --  ABP(mean): --  RR: 17 (26 Oct 2023 04:54) (17 - 18)  SpO2: 94% (26 Oct 2023 04:54) (93% - 94%)    O2 Parameters below as of 26 Oct 2023 04:54  Patient On (Oxygen Delivery Method): room air                 Input and Output:  I&O's Detail    24 Oct 2023 07:01  -  25 Oct 2023 07:00  --------------------------------------------------------  IN:    dextrose 5% + sodium chloride 0.45%: 900 mL  Total IN: 900 mL    OUT:    Indwelling Catheter - Urethral (mL): 2000 mL  Total OUT: 2000 mL    Total NET: -1100 mL      25 Oct 2023 07:01  -  26 Oct 2023 05:10  --------------------------------------------------------  IN:    Oral Fluid: 320 mL  Total IN: 320 mL    OUT:  Total OUT: 0 mL    Total NET: 320 mL          Lab Data                        13.7   5.78  )-----------( 171      ( 25 Oct 2023 07:50 )             39.2     10-25    145  |  111<H>  |  5<L>  ----------------------------<  108<H>  3.2<L>   |  31  |  0.77    Ca    8.8      25 Oct 2023 07:50  Phos  2.1     10-25  Mg     2.2     10-25    TPro  5.9<L>  /  Alb  3.1<L>  /  TBili  1.0  /  DBili  x   /  AST  14<L>  /  ALT  26  /  AlkPhos  42  10-25      CARDIAC MARKERS ( 24 Oct 2023 11:25 )  x     / x     / 84 U/L / x     / x            Review of Systems	      Objective     Physical Examination    heart s1s2  lung dc BS  head nc      Pertinent Lab findings & Imaging      Mani:  NO   Adequate UO     I&O's Detail    24 Oct 2023 07:01  -  25 Oct 2023 07:00  --------------------------------------------------------  IN:    dextrose 5% + sodium chloride 0.45%: 900 mL  Total IN: 900 mL    OUT:    Indwelling Catheter - Urethral (mL): 2000 mL  Total OUT: 2000 mL    Total NET: -1100 mL      25 Oct 2023 07:01  -  26 Oct 2023 05:10  --------------------------------------------------------  IN:    Oral Fluid: 320 mL  Total IN: 320 mL    OUT:  Total OUT: 0 mL    Total NET: 320 mL               Discussed with:     Cultures:	        Radiology

## 2023-10-30 LAB
CULTURE RESULTS: SIGNIFICANT CHANGE UP
SPECIMEN SOURCE: SIGNIFICANT CHANGE UP

## 2023-11-21 ENCOUNTER — NON-APPOINTMENT (OUTPATIENT)
Age: 23
End: 2023-11-21

## 2023-11-22 ENCOUNTER — EMERGENCY (EMERGENCY)
Facility: HOSPITAL | Age: 23
LOS: 0 days | Discharge: HOME | End: 2023-11-22
Attending: EMERGENCY MEDICINE
Payer: COMMERCIAL

## 2023-11-22 VITALS
SYSTOLIC BLOOD PRESSURE: 119 MMHG | OXYGEN SATURATION: 100 % | TEMPERATURE: 98 F | DIASTOLIC BLOOD PRESSURE: 76 MMHG | HEART RATE: 97 BPM | RESPIRATION RATE: 18 BRPM

## 2023-11-22 VITALS
RESPIRATION RATE: 18 BRPM | DIASTOLIC BLOOD PRESSURE: 85 MMHG | TEMPERATURE: 99 F | SYSTOLIC BLOOD PRESSURE: 137 MMHG | HEART RATE: 130 BPM | OXYGEN SATURATION: 100 %

## 2023-11-22 DIAGNOSIS — N45.1 EPIDIDYMITIS: ICD-10-CM

## 2023-11-22 DIAGNOSIS — Z91.018 ALLERGY TO OTHER FOODS: ICD-10-CM

## 2023-11-22 DIAGNOSIS — Z88.6 ALLERGY STATUS TO ANALGESIC AGENT: ICD-10-CM

## 2023-11-22 DIAGNOSIS — N50.812 LEFT TESTICULAR PAIN: ICD-10-CM
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
## 2023-11-22 DIAGNOSIS — Z88.0 ALLERGY STATUS TO PENICILLIN: ICD-10-CM
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
## 2023-11-22 LAB
ALBUMIN SERPL ELPH-MCNC: 4.4 G/DL — SIGNIFICANT CHANGE UP (ref 3.3–5)
ALBUMIN SERPL ELPH-MCNC: 4.4 G/DL — SIGNIFICANT CHANGE UP (ref 3.3–5)
ALP SERPL-CCNC: 57 U/L — SIGNIFICANT CHANGE UP (ref 40–120)
ALP SERPL-CCNC: 57 U/L — SIGNIFICANT CHANGE UP (ref 40–120)
ALT FLD-CCNC: 27 U/L — SIGNIFICANT CHANGE UP (ref 12–78)
ALT FLD-CCNC: 27 U/L — SIGNIFICANT CHANGE UP (ref 12–78)
ANION GAP SERPL CALC-SCNC: 7 MMOL/L — SIGNIFICANT CHANGE UP (ref 5–17)
ANION GAP SERPL CALC-SCNC: 7 MMOL/L — SIGNIFICANT CHANGE UP (ref 5–17)
APPEARANCE UR: CLEAR — SIGNIFICANT CHANGE UP
APPEARANCE UR: CLEAR — SIGNIFICANT CHANGE UP
AST SERPL-CCNC: 22 U/L — SIGNIFICANT CHANGE UP (ref 15–37)
AST SERPL-CCNC: 22 U/L — SIGNIFICANT CHANGE UP (ref 15–37)
BASOPHILS # BLD AUTO: 0.04 K/UL — SIGNIFICANT CHANGE UP (ref 0–0.2)
BASOPHILS # BLD AUTO: 0.04 K/UL — SIGNIFICANT CHANGE UP (ref 0–0.2)
BASOPHILS NFR BLD AUTO: 0.4 % — SIGNIFICANT CHANGE UP (ref 0–2)
BASOPHILS NFR BLD AUTO: 0.4 % — SIGNIFICANT CHANGE UP (ref 0–2)
BILIRUB SERPL-MCNC: 1.6 MG/DL — HIGH (ref 0.2–1.2)
BILIRUB SERPL-MCNC: 1.6 MG/DL — HIGH (ref 0.2–1.2)
BILIRUB UR-MCNC: NEGATIVE — SIGNIFICANT CHANGE UP
BILIRUB UR-MCNC: NEGATIVE — SIGNIFICANT CHANGE UP
BUN SERPL-MCNC: 12 MG/DL — SIGNIFICANT CHANGE UP (ref 7–23)
BUN SERPL-MCNC: 12 MG/DL — SIGNIFICANT CHANGE UP (ref 7–23)
CALCIUM SERPL-MCNC: 9.3 MG/DL — SIGNIFICANT CHANGE UP (ref 8.5–10.1)
CALCIUM SERPL-MCNC: 9.3 MG/DL — SIGNIFICANT CHANGE UP (ref 8.5–10.1)
CHLORIDE SERPL-SCNC: 107 MMOL/L — SIGNIFICANT CHANGE UP (ref 96–108)
CHLORIDE SERPL-SCNC: 107 MMOL/L — SIGNIFICANT CHANGE UP (ref 96–108)
CO2 SERPL-SCNC: 24 MMOL/L — SIGNIFICANT CHANGE UP (ref 22–31)
CO2 SERPL-SCNC: 24 MMOL/L — SIGNIFICANT CHANGE UP (ref 22–31)
COLOR SPEC: YELLOW — SIGNIFICANT CHANGE UP
COLOR SPEC: YELLOW — SIGNIFICANT CHANGE UP
CREAT SERPL-MCNC: 0.82 MG/DL — SIGNIFICANT CHANGE UP (ref 0.5–1.3)
CREAT SERPL-MCNC: 0.82 MG/DL — SIGNIFICANT CHANGE UP (ref 0.5–1.3)
DIFF PNL FLD: NEGATIVE — SIGNIFICANT CHANGE UP
DIFF PNL FLD: NEGATIVE — SIGNIFICANT CHANGE UP
EGFR: 128 ML/MIN/1.73M2 — SIGNIFICANT CHANGE UP
EGFR: 128 ML/MIN/1.73M2 — SIGNIFICANT CHANGE UP
EOSINOPHIL # BLD AUTO: 0.36 K/UL — SIGNIFICANT CHANGE UP (ref 0–0.5)
EOSINOPHIL # BLD AUTO: 0.36 K/UL — SIGNIFICANT CHANGE UP (ref 0–0.5)
EOSINOPHIL NFR BLD AUTO: 3.2 % — SIGNIFICANT CHANGE UP (ref 0–6)
EOSINOPHIL NFR BLD AUTO: 3.2 % — SIGNIFICANT CHANGE UP (ref 0–6)
GLUCOSE SERPL-MCNC: 83 MG/DL — SIGNIFICANT CHANGE UP (ref 70–99)
GLUCOSE SERPL-MCNC: 83 MG/DL — SIGNIFICANT CHANGE UP (ref 70–99)
GLUCOSE UR QL: NEGATIVE MG/DL — SIGNIFICANT CHANGE UP
GLUCOSE UR QL: NEGATIVE MG/DL — SIGNIFICANT CHANGE UP
HCT VFR BLD CALC: 51.3 % — HIGH (ref 39–50)
HCT VFR BLD CALC: 51.3 % — HIGH (ref 39–50)
HGB BLD-MCNC: 17.9 G/DL — HIGH (ref 13–17)
HGB BLD-MCNC: 17.9 G/DL — HIGH (ref 13–17)
IMM GRANULOCYTES NFR BLD AUTO: 0.3 % — SIGNIFICANT CHANGE UP (ref 0–0.9)
IMM GRANULOCYTES NFR BLD AUTO: 0.3 % — SIGNIFICANT CHANGE UP (ref 0–0.9)
KETONES UR-MCNC: NEGATIVE MG/DL — SIGNIFICANT CHANGE UP
KETONES UR-MCNC: NEGATIVE MG/DL — SIGNIFICANT CHANGE UP
LEUKOCYTE ESTERASE UR-ACNC: NEGATIVE — SIGNIFICANT CHANGE UP
LEUKOCYTE ESTERASE UR-ACNC: NEGATIVE — SIGNIFICANT CHANGE UP
LYMPHOCYTES # BLD AUTO: 2.69 K/UL — SIGNIFICANT CHANGE UP (ref 1–3.3)
LYMPHOCYTES # BLD AUTO: 2.69 K/UL — SIGNIFICANT CHANGE UP (ref 1–3.3)
LYMPHOCYTES # BLD AUTO: 24.1 % — SIGNIFICANT CHANGE UP (ref 13–44)
LYMPHOCYTES # BLD AUTO: 24.1 % — SIGNIFICANT CHANGE UP (ref 13–44)
MCHC RBC-ENTMCNC: 28.3 PG — SIGNIFICANT CHANGE UP (ref 27–34)
MCHC RBC-ENTMCNC: 28.3 PG — SIGNIFICANT CHANGE UP (ref 27–34)
MCHC RBC-ENTMCNC: 34.9 GM/DL — SIGNIFICANT CHANGE UP (ref 32–36)
MCHC RBC-ENTMCNC: 34.9 GM/DL — SIGNIFICANT CHANGE UP (ref 32–36)
MCV RBC AUTO: 81 FL — SIGNIFICANT CHANGE UP (ref 80–100)
MCV RBC AUTO: 81 FL — SIGNIFICANT CHANGE UP (ref 80–100)
MONOCYTES # BLD AUTO: 0.72 K/UL — SIGNIFICANT CHANGE UP (ref 0–0.9)
MONOCYTES # BLD AUTO: 0.72 K/UL — SIGNIFICANT CHANGE UP (ref 0–0.9)
MONOCYTES NFR BLD AUTO: 6.5 % — SIGNIFICANT CHANGE UP (ref 2–14)
MONOCYTES NFR BLD AUTO: 6.5 % — SIGNIFICANT CHANGE UP (ref 2–14)
NEUTROPHILS # BLD AUTO: 7.31 K/UL — SIGNIFICANT CHANGE UP (ref 1.8–7.4)
NEUTROPHILS # BLD AUTO: 7.31 K/UL — SIGNIFICANT CHANGE UP (ref 1.8–7.4)
NEUTROPHILS NFR BLD AUTO: 65.5 % — SIGNIFICANT CHANGE UP (ref 43–77)
NEUTROPHILS NFR BLD AUTO: 65.5 % — SIGNIFICANT CHANGE UP (ref 43–77)
NITRITE UR-MCNC: NEGATIVE — SIGNIFICANT CHANGE UP
NITRITE UR-MCNC: NEGATIVE — SIGNIFICANT CHANGE UP
PH UR: 6.5 — SIGNIFICANT CHANGE UP (ref 5–8)
PH UR: 6.5 — SIGNIFICANT CHANGE UP (ref 5–8)
PLATELET # BLD AUTO: 269 K/UL — SIGNIFICANT CHANGE UP (ref 150–400)
PLATELET # BLD AUTO: 269 K/UL — SIGNIFICANT CHANGE UP (ref 150–400)
POTASSIUM SERPL-MCNC: 3.8 MMOL/L — SIGNIFICANT CHANGE UP (ref 3.5–5.3)
POTASSIUM SERPL-MCNC: 3.8 MMOL/L — SIGNIFICANT CHANGE UP (ref 3.5–5.3)
POTASSIUM SERPL-SCNC: 3.8 MMOL/L — SIGNIFICANT CHANGE UP (ref 3.5–5.3)
POTASSIUM SERPL-SCNC: 3.8 MMOL/L — SIGNIFICANT CHANGE UP (ref 3.5–5.3)
PROT SERPL-MCNC: 8.4 GM/DL — HIGH (ref 6–8.3)
PROT SERPL-MCNC: 8.4 GM/DL — HIGH (ref 6–8.3)
PROT UR-MCNC: NEGATIVE MG/DL — SIGNIFICANT CHANGE UP
PROT UR-MCNC: NEGATIVE MG/DL — SIGNIFICANT CHANGE UP
RBC # BLD: 6.33 M/UL — HIGH (ref 4.2–5.8)
RBC # BLD: 6.33 M/UL — HIGH (ref 4.2–5.8)
RBC # FLD: 12.5 % — SIGNIFICANT CHANGE UP (ref 10.3–14.5)
RBC # FLD: 12.5 % — SIGNIFICANT CHANGE UP (ref 10.3–14.5)
SODIUM SERPL-SCNC: 138 MMOL/L — SIGNIFICANT CHANGE UP (ref 135–145)
SODIUM SERPL-SCNC: 138 MMOL/L — SIGNIFICANT CHANGE UP (ref 135–145)
SP GR SPEC: 1.01 — SIGNIFICANT CHANGE UP (ref 1–1.03)
SP GR SPEC: 1.01 — SIGNIFICANT CHANGE UP (ref 1–1.03)
UROBILINOGEN FLD QL: 0.2 MG/DL — SIGNIFICANT CHANGE UP (ref 0.2–1)
UROBILINOGEN FLD QL: 0.2 MG/DL — SIGNIFICANT CHANGE UP (ref 0.2–1)
WBC # BLD: 11.15 K/UL — HIGH (ref 3.8–10.5)
WBC # BLD: 11.15 K/UL — HIGH (ref 3.8–10.5)
WBC # FLD AUTO: 11.15 K/UL — HIGH (ref 3.8–10.5)
WBC # FLD AUTO: 11.15 K/UL — HIGH (ref 3.8–10.5)

## 2023-11-22 PROCEDURE — 99284 EMERGENCY DEPT VISIT MOD MDM: CPT | Mod: 25

## 2023-11-22 PROCEDURE — 87491 CHLMYD TRACH DNA AMP PROBE: CPT

## 2023-11-22 PROCEDURE — 87591 N.GONORRHOEAE DNA AMP PROB: CPT

## 2023-11-22 PROCEDURE — 85025 COMPLETE CBC W/AUTO DIFF WBC: CPT

## 2023-11-22 PROCEDURE — 81003 URINALYSIS AUTO W/O SCOPE: CPT

## 2023-11-22 PROCEDURE — 99284 EMERGENCY DEPT VISIT MOD MDM: CPT

## 2023-11-22 PROCEDURE — 36415 COLL VENOUS BLD VENIPUNCTURE: CPT

## 2023-11-22 PROCEDURE — 80053 COMPREHEN METABOLIC PANEL: CPT

## 2023-11-22 PROCEDURE — 87086 URINE CULTURE/COLONY COUNT: CPT

## 2023-11-22 PROCEDURE — 76870 US EXAM SCROTUM: CPT

## 2023-11-22 PROCEDURE — 76870 US EXAM SCROTUM: CPT | Mod: 26

## 2023-11-22 NOTE — ED STATDOCS - NSFOLLOWUPINSTRUCTIONS_ED_ALL_ED_FT
Follow up with Dr. Somers on Monday. Take antibiotics as prescribed. Return to ED for new or worsening symptoms.    Epididymitis  The male reproductive organ, showing the epididymis and the testicle.  Epididymitis is inflammation or swelling of the epididymis. This is caused by an infection. The epididymis is a cord-like structure that is located along the top and back part of the testicle. It collects and stores sperm from the testicle.    This condition can also cause pain and swelling of the testicle and scrotum. Symptoms usually start suddenly (acute epididymitis). Sometimes epididymitis starts gradually and lasts for a while (chronic epididymitis). Chronic epididymitis may be harder to treat.    What are the causes?  In men ages 20–40, this condition is usually caused by a bacterial infection or a sexually transmitted infection (STI), such as gonorrhea or chlamydia.    In men 40 and older, this condition is usually caused by bacteria from a urinary blockage or from abnormalities in the urinary system. These can result from:  Having a tube placed into the bladder (urinary catheter).  Having an enlarged or inflamed prostate gland.  Having recently had urinary tract surgery.  Having a problem with a backward flow of urine (retrograde).  In men who have a condition that weakens the body's defense system (immune system), such as human immunodeficiency virus (HIV), this condition can be caused by:  Other bacteria, including tuberculosis and syphilis.  Viruses.  Fungi.  Sometimes this condition occurs without infection. This may happen because of trauma or repetitive activities such as sports.    What increases the risk?  You are more likely to develop this condition if you have:  Unprotected sex with more than one partner.  Anal sex.  Had recent surgery.  A urinary catheter.  Urinary problems.  A suppressed immune system.  What are the signs or symptoms?  This condition usually begins suddenly with chills, fever, and pain behind the scrotum and in the testicle. Other symptoms include:  Swelling of the scrotum, testicle, or both.  Pain when ejaculating or urinating.  Pain in the back or abdomen.  Nausea.  Itching and discharge from the penis.  A frequent need to pass urine.  Redness, increased warmth, and tenderness of the scrotum.  How is this diagnosed?  Your health care provider can diagnose this condition based on your symptoms and medical history. Your health care provider will also do a physical exam to check your scrotum and testicle for swelling, pain, and redness. You may also have other tests, including:  Testing of discharge from the penis.  Testing your urine for infections, such as STIs.  Ultrasound to check for blood flow and inflammation.  Your health care provider may test you for other STIs, including HIV.    How is this treated?  Treatment for this condition depends on the cause. If your condition is caused by a bacterial infection, oral antibiotic medicine may be prescribed. If the bacterial infection has spread to your blood, you may need to receive IV antibiotics.    For both bacterial and nonbacterial epididymitis, you may be treated with:  Rest.  Elevation of the scrotum.  Pain medicines.  Anti-inflammatory medicines.  Surgery may be needed if:  You have pus buildup in the scrotum (abscess).  You have epididymitis that has not responded to other treatments.  Follow these instructions at home:  Medicines    Take over-the-counter and prescription medicines only as told by your health care provider.  If you were prescribed an antibiotic medicine, take it as told by your health care provider. Do not stop taking the antibiotic even if your condition improves.  Sexual activity    If your epididymitis was caused by an STI, avoid sexual activity until your treatment is complete.  Inform your sexual partner or partners if you test positive for an STI. They may need to be treated. Do not engage in sexual activity with your partner or partners until their treatment is completed.  Managing pain and swelling    A bathtub partially filled with water.  If directed, raise (elevate) your scrotum and apply ice. To do this:  Put ice in a plastic bag.  Place a small towel or pillow between your legs.  Rest your scrotum on the pillow or towel.  Place another towel between your skin and the plastic bag.  Leave the ice on for 20 minutes, 2–3 times a day.  Remove the ice if your skin turns bright red. This is very important. If you cannot feel pain, heat, or cold, you have a greater risk of damage to the area.  Keep your scrotum elevated and supported while resting. Ask your health care provider if you should wear a scrotal support, such as a jockstrap. Wear it as told by your health care provider.  Try taking a sitz bath to help with discomfort. This is a warm water bath that is taken while you are sitting down. The water should come up to your hips and should cover your buttocks. Do this 3–4 times per day or as told by your health care provider.  General instructions    Drink enough fluid to keep your urine pale yellow.  Return to your normal activities as told by your health care provider. Ask your health care provider what activities are safe for you.  Keep all follow-up visits. This is important.  Contact a health care provider if:  You have a fever.  Your pain medicine is not helping.  Your pain is getting worse.  Your symptoms do not improve within 3 days.  Summary  Epididymitis is inflammation or swelling of the epididymis. This is caused by an infection. This condition can also cause pain and swelling of the testicle and scrotum.  Treatment for this condition depends on the cause. If your condition is caused by a bacterial infection, oral antibiotic medicine may be prescribed.  Inform your sexual partner or partners if you test positive for an STI. They may need to be treated. Do not engage in sexual activity with your partner or partners until their treatment is completed.  Contact a health care provider if your symptoms do not improve within 3 days.  This information is not intended to replace advice given to you by your health care provider. Make sure you discuss any questions you have with your health care provider.

## 2023-11-22 NOTE — ED STATDOCS - OBJECTIVE STATEMENT
22 y/o male with no pertinent PMHx presents to the ED c/o left testicular pain. Patient states 2 weeks ago had some trauma to left testicle when paramedic picked him up by his underwear, hurting the left side. Was mildly discomfort, worse in the last day after walking 8 miles. Endorses left inguinal pain. Denies swelling, rash, flank pain, dysuria, hematuria, fever, prior occurrence. No meds PTA

## 2023-11-22 NOTE — ED STATDOCS - PATIENT PORTAL LINK FT
You can access the FollowMyHealth Patient Portal offered by [facility] by registering at the following website: [email or web address]. By joining [employer]’s FollowMyHealth portal, you will also be able to view your health information using other applications (apps) compatible with our system.

## 2023-11-22 NOTE — ED STATDOCS - CLINICAL SUMMARY MEDICAL DECISION MAKING FREE TEXT BOX
US and UA obtained.  Diagnosis of epididymitis.  Discussed with Dr. Somers, urology, recommending Bactrim and will follow up as outpatient.

## 2023-11-22 NOTE — ED STATDOCS - CARE PROVIDER_API CALL
Rafi Somers Artem  Urology  [address] [place] [address], [address]  [place] [address]  Phone: [phone]  Fax: [phone]  Follow Up Time:

## 2023-11-22 NOTE — ED STATDOCS - PROGRESS NOTE DETAILS
24 y/o male presents to ED c/o left testicular pain for 2 weeks s/p trauma to left testicle. No other symptoms. Will follow urine, ultrasound, reeval. - Monique Quinn PA-C UA and ultrasound reviewed. Urine negative. Ultrasound demonstrated epididymal thickening with limited flow to epididymal tail c/w epididymitis. Pt and mother made aware. D/w Dr. Somers. Recommends bactrim and f/u outpatient on Monday. Pt understands and agrees to plan. Mother requesting basic labs. 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  Stable for dc. Strict return precautions were given. All questions and concerns were addressed. - Monique Quinn PA-C

## 2023-11-22 NOTE — ED STATDOCS - NS ED ATTENDING STATEMENT MOD
This was a shared visit with the OTILIO. I reviewed and verified the documentation and independently performed the documented:

## 2023-11-22 NOTE — ED STATDOCS - GENITOURINARY, MLM
normal... normal testicular lie, no swelling, mild posterior testicle swelling, no inguinal tenderness, no inguinal hernia

## 2023-11-23 PROBLEM — Z78.9 OTHER SPECIFIED HEALTH STATUS: Chronic | Status: ACTIVE | Noted: 2023-10-24

## 2023-11-23 LAB
CULTURE RESULTS: SIGNIFICANT CHANGE UP
CULTURE RESULTS: SIGNIFICANT CHANGE UP
SPECIMEN SOURCE: SIGNIFICANT CHANGE UP
SPECIMEN SOURCE: SIGNIFICANT CHANGE UP

## 2024-02-14 NOTE — ED ADULT NURSE NOTE - SUICIDE SCREENING DEPRESSION
[FreeTextEntry1] : ORIGINAL PRESENTATION: This is a 60-year-old man complaining of neck and lower back pain. The patient has had this pain for 3 months. The pain started after a work-related injury which took place on 03/03/2023. 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 Patient describes pain as moderate to severe. During the last month the pain has been nearly constant with symptoms worsening in no typical pattern. Pain described as sharp and shooting. Pain is not associated with numbness or weakness.  Pain is increased with lying down, standing, sitting, walking, exercise, and coughing/sneezing. Bowel or bladder habits have not changed.   ACTIVITIES: Patient could walk 1 block before the pain starts. Patient can sit 1 hour  before pain starts. Patient can stand 30 minutes before pain starts. The patient sometimes lays down because of pain. Patient uses no assistive walking device at this time. Patient has difficulty going to work, performing household chores, doing yard work or shopping, participating in recreational activities, and exercising at this time.  PRIOR PAIN TREATMENTS:  No prior pain treatment  PRIOR PAIN MEDICATIONS:  Tylenol  PATIENT PRESENTS FOR FOLLOW UP: This is a former Dr. Wright patient transferring his care to me. He was last seen in the office in October 2023 for complaints of neck and mid back pain. He denies any radicular symptoms into the lower extremities. He previously underwent a caudal ALMA on 8/30/23 which provided him with no sustained relief. He then underwent a bilateral L4-5 transforaminal ALMA on 9/13/23 which provided him with no relief as well. He has been pending physical therapy with workers compensation. He is referred back by Dr. Martinez for further evaluation to discuss possible RFA injection therapy. He is aware the first step would be diagnostic medial branch blocks and he is agreeable to move forward.   Of note, he worked for a short time after the incident. He reports his last day of work was April 23rd 2023.  Negative

## 2024-02-16 ENCOUNTER — APPOINTMENT (OUTPATIENT)
Dept: UROLOGY | Facility: CLINIC | Age: 24
End: 2024-02-16
Payer: COMMERCIAL

## 2024-02-16 VITALS
HEART RATE: 143 BPM | BODY MASS INDEX: 29.4 KG/M2 | WEIGHT: 194 LBS | OXYGEN SATURATION: 95 % | DIASTOLIC BLOOD PRESSURE: 89 MMHG | SYSTOLIC BLOOD PRESSURE: 146 MMHG | HEIGHT: 68 IN

## 2024-02-16 DIAGNOSIS — N48.9 DISORDER OF PENIS, UNSPECIFIED: ICD-10-CM

## 2024-02-16 DIAGNOSIS — N48.89 OTHER SPECIFIED DISORDERS OF PENIS: ICD-10-CM

## 2024-02-16 DIAGNOSIS — Z78.9 OTHER SPECIFIED HEALTH STATUS: ICD-10-CM

## 2024-02-16 PROBLEM — Z00.00 ENCOUNTER FOR PREVENTIVE HEALTH EXAMINATION: Status: ACTIVE | Noted: 2024-02-16

## 2024-02-16 PROCEDURE — 99203 OFFICE O/P NEW LOW 30 MIN: CPT

## 2024-02-17 ENCOUNTER — INPATIENT (INPATIENT)
Facility: HOSPITAL | Age: 24
LOS: 2 days | Discharge: HOME | DRG: 897 | End: 2024-02-20
Attending: INTERNAL MEDICINE | Admitting: STUDENT IN AN ORGANIZED HEALTH CARE EDUCATION/TRAINING PROGRAM
Payer: COMMERCIAL

## 2024-02-17 VITALS
DIASTOLIC BLOOD PRESSURE: 102 MMHG | RESPIRATION RATE: 22 BRPM | HEART RATE: 130 BPM | SYSTOLIC BLOOD PRESSURE: 164 MMHG | OXYGEN SATURATION: 99 %

## 2024-02-17 DIAGNOSIS — R09.89 OTHER SPECIFIED SYMPTOMS AND SIGNS INVOLVING THE CIRCULATORY AND RESPIRATORY SYSTEMS: ICD-10-CM

## 2024-02-17 DIAGNOSIS — R45.1 RESTLESSNESS AND AGITATION: ICD-10-CM

## 2024-02-17 LAB
ADD ON TEST-SPECIMEN IN LAB: SIGNIFICANT CHANGE UP
ALBUMIN SERPL ELPH-MCNC: 5 G/DL — SIGNIFICANT CHANGE UP (ref 3.3–5)
ALP SERPL-CCNC: 60 U/L — SIGNIFICANT CHANGE UP (ref 40–120)
ALT FLD-CCNC: 46 U/L — HIGH (ref 10–45)
ANION GAP SERPL CALC-SCNC: 27 MMOL/L — HIGH (ref 5–17)
APAP SERPL-MCNC: <15 UG/ML — SIGNIFICANT CHANGE UP (ref 10–30)
AST SERPL-CCNC: 42 U/L — HIGH (ref 10–40)
B-OH-BUTYR SERPL-SCNC: 1.7 MMOL/L — HIGH
BASE EXCESS BLDV CALC-SCNC: 2.3 MMOL/L — SIGNIFICANT CHANGE UP (ref -2–3)
BASOPHILS # BLD AUTO: 0.07 K/UL — SIGNIFICANT CHANGE UP (ref 0–0.2)
BASOPHILS NFR BLD AUTO: 0.5 % — SIGNIFICANT CHANGE UP (ref 0–2)
BILIRUB SERPL-MCNC: 1.4 MG/DL — HIGH (ref 0.2–1.2)
BUN SERPL-MCNC: 8 MG/DL — SIGNIFICANT CHANGE UP (ref 7–23)
CA-I SERPL-SCNC: 1.18 MMOL/L — SIGNIFICANT CHANGE UP (ref 1.15–1.33)
CALCIUM SERPL-MCNC: 10.4 MG/DL — SIGNIFICANT CHANGE UP (ref 8.4–10.5)
CHLORIDE BLDV-SCNC: 102 MMOL/L — SIGNIFICANT CHANGE UP (ref 96–108)
CHLORIDE SERPL-SCNC: 94 MMOL/L — LOW (ref 96–108)
CK SERPL-CCNC: 787 U/L — HIGH (ref 30–200)
CO2 BLDV-SCNC: 28 MMOL/L — HIGH (ref 22–26)
CO2 SERPL-SCNC: 16 MMOL/L — LOW (ref 22–31)
CREAT SERPL-MCNC: 0.98 MG/DL — SIGNIFICANT CHANGE UP (ref 0.5–1.3)
EGFR: 112 ML/MIN/1.73M2 — SIGNIFICANT CHANGE UP
EOSINOPHIL # BLD AUTO: 0.23 K/UL — SIGNIFICANT CHANGE UP (ref 0–0.5)
EOSINOPHIL NFR BLD AUTO: 1.7 % — SIGNIFICANT CHANGE UP (ref 0–6)
ETHANOL SERPL-MCNC: <10 MG/DL — SIGNIFICANT CHANGE UP (ref 0–10)
FLUAV AG NPH QL: SIGNIFICANT CHANGE UP
FLUBV AG NPH QL: SIGNIFICANT CHANGE UP
GAS PNL BLDV: 135 MMOL/L — LOW (ref 136–145)
GAS PNL BLDV: SIGNIFICANT CHANGE UP
GLUCOSE BLDV-MCNC: 87 MG/DL — SIGNIFICANT CHANGE UP (ref 70–99)
GLUCOSE SERPL-MCNC: 184 MG/DL — HIGH (ref 70–99)
HCO3 BLDV-SCNC: 26 MMOL/L — SIGNIFICANT CHANGE UP (ref 22–29)
HCT VFR BLD CALC: 50.5 % — HIGH (ref 39–50)
HCT VFR BLDA CALC: 48 % — SIGNIFICANT CHANGE UP (ref 39–51)
HGB BLD CALC-MCNC: 16.1 G/DL — SIGNIFICANT CHANGE UP (ref 12.6–17.4)
HGB BLD-MCNC: 17.7 G/DL — HIGH (ref 13–17)
IMM GRANULOCYTES NFR BLD AUTO: 0.4 % — SIGNIFICANT CHANGE UP (ref 0–0.9)
LACTATE BLDV-MCNC: 1.4 MMOL/L — SIGNIFICANT CHANGE UP (ref 0.5–2)
LYMPHOCYTES # BLD AUTO: 22.9 % — SIGNIFICANT CHANGE UP (ref 13–44)
LYMPHOCYTES # BLD AUTO: 3.13 K/UL — SIGNIFICANT CHANGE UP (ref 1–3.3)
MCHC RBC-ENTMCNC: 28.1 PG — SIGNIFICANT CHANGE UP (ref 27–34)
MCHC RBC-ENTMCNC: 35 GM/DL — SIGNIFICANT CHANGE UP (ref 32–36)
MCV RBC AUTO: 80.3 FL — SIGNIFICANT CHANGE UP (ref 80–100)
MONOCYTES # BLD AUTO: 0.92 K/UL — HIGH (ref 0–0.9)
MONOCYTES NFR BLD AUTO: 6.7 % — SIGNIFICANT CHANGE UP (ref 2–14)
NEUTROPHILS # BLD AUTO: 9.27 K/UL — HIGH (ref 1.8–7.4)
NEUTROPHILS NFR BLD AUTO: 67.8 % — SIGNIFICANT CHANGE UP (ref 43–77)
NRBC # BLD: 0 /100 WBCS — SIGNIFICANT CHANGE UP (ref 0–0)
PCO2 BLDV: 39 MMHG — LOW (ref 42–55)
PH BLDV: 7.44 — HIGH (ref 7.32–7.43)
PLATELET # BLD AUTO: 351 K/UL — SIGNIFICANT CHANGE UP (ref 150–400)
PO2 BLDV: 72 MMHG — HIGH (ref 25–45)
POTASSIUM BLDV-SCNC: 4.2 MMOL/L — SIGNIFICANT CHANGE UP (ref 3.5–5.1)
POTASSIUM SERPL-MCNC: 3.5 MMOL/L — SIGNIFICANT CHANGE UP (ref 3.5–5.3)
POTASSIUM SERPL-SCNC: 3.5 MMOL/L — SIGNIFICANT CHANGE UP (ref 3.5–5.3)
PROT SERPL-MCNC: 8.1 G/DL — SIGNIFICANT CHANGE UP (ref 6–8.3)
RBC # BLD: 6.29 M/UL — HIGH (ref 4.2–5.8)
RBC # FLD: 12.4 % — SIGNIFICANT CHANGE UP (ref 10.3–14.5)
RSV RNA NPH QL NAA+NON-PROBE: SIGNIFICANT CHANGE UP
SALICYLATES SERPL-MCNC: <2 MG/DL — LOW (ref 15–30)
SAO2 % BLDV: 95.6 % — HIGH (ref 67–88)
SARS-COV-2 RNA SPEC QL NAA+PROBE: SIGNIFICANT CHANGE UP
SODIUM SERPL-SCNC: 137 MMOL/L — SIGNIFICANT CHANGE UP (ref 135–145)
WBC # BLD: 13.67 K/UL — HIGH (ref 3.8–10.5)
WBC # FLD AUTO: 13.67 K/UL — HIGH (ref 3.8–10.5)

## 2024-02-17 PROCEDURE — 99285 EMERGENCY DEPT VISIT HI MDM: CPT

## 2024-02-17 PROCEDURE — 99223 1ST HOSP IP/OBS HIGH 75: CPT

## 2024-02-17 RX ORDER — BACLOFEN 100 %
20 POWDER (GRAM) MISCELLANEOUS ONCE
Refills: 0 | Status: COMPLETED | OUTPATIENT
Start: 2024-02-17 | End: 2024-02-17

## 2024-02-17 RX ORDER — SODIUM CHLORIDE 9 MG/ML
1000 INJECTION, SOLUTION INTRAVENOUS ONCE
Refills: 0 | Status: COMPLETED | OUTPATIENT
Start: 2024-02-17 | End: 2024-02-17

## 2024-02-17 RX ORDER — BACLOFEN 100 %
20 POWDER (GRAM) MISCELLANEOUS DAILY
Refills: 0 | Status: DISCONTINUED | OUTPATIENT
Start: 2024-02-17 | End: 2024-02-18

## 2024-02-17 RX ORDER — DIAZEPAM 5 MG
10 TABLET ORAL EVERY 6 HOURS
Refills: 0 | Status: DISCONTINUED | OUTPATIENT
Start: 2024-02-17 | End: 2024-02-18

## 2024-02-17 RX ORDER — DIAZEPAM 5 MG
TABLET ORAL
Refills: 0 | Status: DISCONTINUED | OUTPATIENT
Start: 2024-02-17 | End: 2024-02-18

## 2024-02-17 RX ORDER — KETAMINE HYDROCHLORIDE 100 MG/ML
350 INJECTION INTRAMUSCULAR; INTRAVENOUS ONCE
Refills: 0 | Status: DISCONTINUED | OUTPATIENT
Start: 2024-02-17 | End: 2024-02-17

## 2024-02-17 RX ORDER — ACETAMINOPHEN 500 MG
650 TABLET ORAL EVERY 6 HOURS
Refills: 0 | Status: DISCONTINUED | OUTPATIENT
Start: 2024-02-17 | End: 2024-02-20

## 2024-02-17 RX ORDER — DIAZEPAM 5 MG
5 TABLET ORAL ONCE
Refills: 0 | Status: DISCONTINUED | OUTPATIENT
Start: 2024-02-17 | End: 2024-02-17

## 2024-02-17 RX ORDER — DIAZEPAM 5 MG
10 TABLET ORAL
Refills: 0 | Status: DISCONTINUED | OUTPATIENT
Start: 2024-02-17 | End: 2024-02-18

## 2024-02-17 RX ORDER — LANOLIN ALCOHOL/MO/W.PET/CERES
3 CREAM (GRAM) TOPICAL AT BEDTIME
Refills: 0 | Status: DISCONTINUED | OUTPATIENT
Start: 2024-02-17 | End: 2024-02-20

## 2024-02-17 RX ORDER — THIAMINE MONONITRATE (VIT B1) 100 MG
100 TABLET ORAL DAILY
Refills: 0 | Status: DISCONTINUED | OUTPATIENT
Start: 2024-02-17 | End: 2024-02-20

## 2024-02-17 RX ORDER — DIAZEPAM 5 MG
10 TABLET ORAL ONCE
Refills: 0 | Status: DISCONTINUED | OUTPATIENT
Start: 2024-02-17 | End: 2024-02-17

## 2024-02-17 RX ADMIN — SODIUM CHLORIDE 1000 MILLILITER(S): 9 INJECTION, SOLUTION INTRAVENOUS at 15:55

## 2024-02-17 RX ADMIN — Medication 10 MILLIGRAM(S): at 15:27

## 2024-02-17 RX ADMIN — KETAMINE HYDROCHLORIDE 350 MILLIGRAM(S): 100 INJECTION INTRAMUSCULAR; INTRAVENOUS at 13:58

## 2024-02-17 RX ADMIN — SODIUM CHLORIDE 1000 MILLILITER(S): 9 INJECTION, SOLUTION INTRAVENOUS at 14:58

## 2024-02-17 RX ADMIN — Medication 10 MILLIGRAM(S): at 19:37

## 2024-02-17 RX ADMIN — Medication 5 MILLIGRAM(S): at 14:46

## 2024-02-17 NOTE — H&P ADULT - NSHPPHYSICALEXAM_GEN_ALL_CORE
Vital Signs Last 24 Hrs  T(C): 36.7 (02-17-24 @ 16:29), Max: 38.1 (02-17-24 @ 15:45)  T(F): 98 (02-17-24 @ 16:29), Max: 100.6 (02-17-24 @ 15:45)  HR: 109 (02-17-24 @ 19:35) (109 - 130)  BP: 131/81 (02-17-24 @ 19:35) (125/69 - 164/102)  BP(mean): 95 (02-17-24 @ 19:35) (95 - 95)  RR: 18 (02-17-24 @ 19:35) (16 - 22)  SpO2: 99% (02-17-24 @ 19:35) (98% - 99%)

## 2024-02-17 NOTE — H&P ADULT - PROBLEM SELECTOR PLAN 1
Patient very reluctant to take baclofen due to fear of tinnitus. Feels he had tinnitus after receiving haldol at some point in college and for the several months he suffered from it he was very distraught. Appreciate toxicology recommendations, pt likely suffering from GERMAN-B withdrawal. Recommend baclofen protocol for GERMAN-B withdrawal  -CIWA  -Will cont. IV Diazepam symptom triggered CIWA for now as pt still reluctant to take baclofen and would like to do some additional reading. Willing to consider trial of a 5mg baclofen dose, a PRN dose has been ordered for patient to activate  -Likely can d/c PRN diazepam when patient regularly taking baclofen taper  -Goal taper below if symptoms controlled with baclofen 20mg:  -Continue Baclofen 20 mg for 2 days from induction  -On days 3-4: give 15 mg Baclofen PO  -On days 5-6: give 10 mg Baclofen PO  -On days 6-7: Give 5 mg Baclofen PO  -On day 8: DC Baclofen  -F/u toxicology recommendations  -Would consult inpatient psych in AM to help with capacity  -Please see provider handoff

## 2024-02-17 NOTE — ED ADULT NURSE NOTE - NSFALLRISKINTERV_ED_ALL_ED
Assistance OOB with selected safe patient handling equipment if applicable/Communicate fall risk and risk factors to all staff, patient, and family/Encourage patient to sit up slowly, dangle for a short time, stand at bedside before walking/Orthostatic vital signs/Provide visual cue: yellow wristband, yellow gown, etc/Reinforce activity limits and safety measures with patient and family/Review medications for side effects contributing to fall risk/Toileting schedule using arm’s reach rule for commode and bathroom/Call bell, personal items and telephone in reach/Instruct patient to call for assistance before getting out of bed/chair/stretcher/Non-slip footwear applied when patient is off stretcher/[place] to call system/Physically safe environment - no spills, clutter or unnecessary equipment/Purposeful Proactive Rounding/Room/bathroom lighting operational, light cord in reach

## 2024-02-17 NOTE — H&P ADULT - TIME BILLING
none Need to interview and examine patient, discuss care with family, review telepsych recommendations, provide counseling, coordinate care, place orders, document, personally review imaging, ekg and review prior medical records

## 2024-02-17 NOTE — H&P ADULT - PROBLEM SELECTOR PLAN 2
Pt waxing and waning episodes of agitation thought to be 2/2 combination of withdrawal and paranoia. Able to be redirected with extensive discussion. Pt very concerned with receiving too much psychoactive medication  -Given pt's severe fear of tinnitus from haldol, would avoid if possible  -Pt currently seems aware he does not have safe discharge plan but this has been waxing and waning  -Cont. 1:1 for now, will need to reassess capacity at regular intervals

## 2024-02-17 NOTE — ED ADULT NURSE NOTE - OBJECTIVE STATEMENT
Pt is a 24 year old male escorted by his mom arrived in the ED very agitated and loud, delusional and disorganized. Pt mom reports that pt has been abusing medication for 1 week and has been very restless. De escalation techniques innefective, pt received Ketamine 350mg IM at 1358H. Pt had to be assisted with hospital gowns. Hospital security searched and wanded pt, belongings were confiscated for safekeeping, valuables given to pt mother. Pt is a 24 year old male escorted by his mom arrived in the ED very agitated and loud, delusional and disorganized. Pt mom reports that pt has been abusing drugs for 1 week and has been very restless. De escalation techniques innefective, pt received Ketamine 350mg  IM at 1358H. Pt had to be assisted with hospital gowns by several staff members. Hospital security searched and wanded pt, belongings were confiscated for safekeeping, Pt was transferred to [facility], 20G IV cath on right AC placed,  he received diazepam 5 IV push, Lactated Ringers 1 L bolus started at  1458. Pt started to  scream and escalate by 1527, he received additional diazepam 10 IV push. Pt refused Baclofen 20 mg po. Pt is restless at this time. Pt on CO for safety, HOB elevated, pt on cardiac and end tidal monitor. Pt unable to answer assessment questions regarding SI/HI/P/I at the time he arrived. Pt is currently placed on Fall precaution due to increased sedation at this time.

## 2024-02-17 NOTE — H&P ADULT - ASSESSMENT
24M w/ hx of THC/ CBG/ HHC as well as phenibut use p/w agitation and eye poking in setting of phenibut withdrawal

## 2024-02-17 NOTE — H&P ADULT - PROBLEM SELECTOR PLAN 4
Takes HHC, CBG, THC at home. Had significant reactions to marijuana and edibles in past.  -F/u psych/ tox recommendations

## 2024-02-17 NOTE — ED PROVIDER NOTE - ATTENDING CONTRIBUTION TO CARE
Presenting with acute manic episode. Has been taking ___. On exam, pacing around, agitated, pressured speech, speaking to himself, abdomen benign, lungs CTAB, warm to the touch. Will give IM ketamine, obtain psych labs, consult psych and tox, likely TBA. Presenting with acute manic episode. Has been taking Phenibut (and trying to wean off by himself). On exam, pacing around, agitated, pressured speech, speaking to himself, abdomen benign, lungs CTAB, warm to the touch. Will give IM ketamine, obtain psych labs, consult psych and tox, likely TBA.

## 2024-02-17 NOTE — ED ADULT NURSE REASSESSMENT NOTE - NS ED NURSE REASSESS COMMENT FT1
Pt suddenly became increasingly agitated while talking to mother. MD Moy at bedside. Pt angry because he does not want to take valium or baclofen. MD Moy told pt we wont give those medications as long as he is compliant with remaining calm and allowing ED staff to vital him.

## 2024-02-17 NOTE — ED PROVIDER NOTE - PROGRESS NOTE DETAILS
patient screaming again after ketamine and first dose valium, will redose. patient calmer after second dose valium, still refuses baclofen PO.

## 2024-02-17 NOTE — H&P ADULT - HISTORY OF PRESENT ILLNESS
24M w/ hx of THC/ CBG/ HHC as well as phenibut use p/w agitation and eye poking in setting of phenibut withdrawal. Pt has been living with mother, having discussions over substance use and last week decided to quit phenibut cold turkey. Has not always been honest regarding substance use with mother in past. Has had insomnia and agitation at home during week. Mother has been giving 6 packs of beer daily to help with symptoms. Small amounts of wine occasionally as well. Patient and mother were in middle of argument when patient was gesturing towards his own head with  24M w/ hx of THC/ CBG/ HHC as well as phenibut use p/w agitation and eye poking in setting of phenibut withdrawal. Pt has been living with mother, having discussions over substance use and last week decided to quit phenibut cold turkey. Has not always been honest regarding substance use with mother in past. Has had insomnia and agitation at home during week. Mother has been giving 6 packs of beer daily to help with symptoms. Small amounts of wine occasionally as well. Patient and mother were in middle of argument when patient was gesturing towards his own head with his finger and accidentally poked his right eye. Became concerned over momentary loss of vision and pain. Mother brought him to hospital for further evaluation where phenibut withdrawal symptoms became more apparent. He had reportedly try to titrate off phenibut, and possibly had stopped using it for last 1 week. He took a dose 500 mg today around 10:30 am. No other reported medications used. No suicide attempt. Patient wants to get better and eventually be off phenibut.    In ER: Given Valium 10mg IV x2, 5mg IV x1, ketamine, LR 2L,

## 2024-02-17 NOTE — ED ADULT NURSE REASSESSMENT NOTE - DESCRIPTION
Pt awake, refused medication Baclofen
Pt counseled regarding drug use, pt is receptive and said he wants to change his life. Pt continues to refuse medication Baclofen as ordered. Pt was educated by MD regarding the risk of seizure episodes but pt continued to refuse. Continue CO for safety
No

## 2024-02-17 NOTE — H&P ADULT - PROBLEM SELECTOR PLAN 5
S/p accidental finger poke of R eye, endorses preserved vision, minimal pain  -Opthalmology consult called overnight, reportedly will see in AM

## 2024-02-17 NOTE — H&P ADULT - PROBLEM SELECTOR PLAN 3
Note slight transaminitis and CK elevation. Possibly in setting of recent ETOH/ substance use?  -Trend CMP  -Will order hepatitis panel  -Trend leukocytosis

## 2024-02-17 NOTE — H&P ADULT - NSHPLABSRESULTS_GEN_ALL_CORE
I have personally reviewed the labs, imaging and ekg. I have personally reviewed the labs Note WBC 13.67

## 2024-02-18 DIAGNOSIS — F12.20 CANNABIS DEPENDENCE, UNCOMPLICATED: ICD-10-CM

## 2024-02-18 DIAGNOSIS — S05.8X9A OTHER INJURIES OF UNSPECIFIED EYE AND ORBIT, INITIAL ENCOUNTER: ICD-10-CM

## 2024-02-18 DIAGNOSIS — E80.6 OTHER DISORDERS OF BILIRUBIN METABOLISM: ICD-10-CM

## 2024-02-18 DIAGNOSIS — R74.01 ELEVATION OF LEVELS OF LIVER TRANSAMINASE LEVELS: ICD-10-CM

## 2024-02-18 DIAGNOSIS — Z91.89 OTHER SPECIFIED PERSONAL RISK FACTORS, NOT ELSEWHERE CLASSIFIED: ICD-10-CM

## 2024-02-18 DIAGNOSIS — M62.82 RHABDOMYOLYSIS: ICD-10-CM

## 2024-02-18 DIAGNOSIS — Z29.9 ENCOUNTER FOR PROPHYLACTIC MEASURES, UNSPECIFIED: ICD-10-CM

## 2024-02-18 DIAGNOSIS — N48.9 DISORDER OF PENIS, UNSPECIFIED: ICD-10-CM

## 2024-02-18 LAB
ADD ON TEST-SPECIMEN IN LAB: SIGNIFICANT CHANGE UP
ALBUMIN SERPL ELPH-MCNC: 4.5 G/DL — SIGNIFICANT CHANGE UP (ref 3.3–5)
ALP SERPL-CCNC: 53 U/L — SIGNIFICANT CHANGE UP (ref 40–120)
ALT FLD-CCNC: 40 U/L — SIGNIFICANT CHANGE UP (ref 10–45)
ANION GAP SERPL CALC-SCNC: 16 MMOL/L — SIGNIFICANT CHANGE UP (ref 5–17)
AST SERPL-CCNC: 36 U/L — SIGNIFICANT CHANGE UP (ref 10–40)
B-OH-BUTYR SERPL-SCNC: 0.9 MMOL/L — HIGH
BASE EXCESS BLDV CALC-SCNC: 3.3 MMOL/L — HIGH (ref -2–3)
BASOPHILS # BLD AUTO: 0.04 K/UL — SIGNIFICANT CHANGE UP (ref 0–0.2)
BASOPHILS NFR BLD AUTO: 0.5 % — SIGNIFICANT CHANGE UP (ref 0–2)
BILIRUB DIRECT SERPL-MCNC: 0.2 MG/DL — SIGNIFICANT CHANGE UP (ref 0–0.3)
BILIRUB INDIRECT FLD-MCNC: 1.2 MG/DL — HIGH (ref 0.2–1)
BILIRUB SERPL-MCNC: 1.4 MG/DL — HIGH (ref 0.2–1.2)
BUN SERPL-MCNC: 7 MG/DL — SIGNIFICANT CHANGE UP (ref 7–23)
CA-I SERPL-SCNC: 1.19 MMOL/L — SIGNIFICANT CHANGE UP (ref 1.15–1.33)
CALCIUM SERPL-MCNC: 9.7 MG/DL — SIGNIFICANT CHANGE UP (ref 8.4–10.5)
CHLORIDE BLDV-SCNC: 102 MMOL/L — SIGNIFICANT CHANGE UP (ref 96–108)
CHLORIDE SERPL-SCNC: 102 MMOL/L — SIGNIFICANT CHANGE UP (ref 96–108)
CK SERPL-CCNC: 936 U/L — HIGH (ref 30–200)
CO2 BLDV-SCNC: 29 MMOL/L — HIGH (ref 22–26)
CO2 SERPL-SCNC: 24 MMOL/L — SIGNIFICANT CHANGE UP (ref 22–31)
CREAT SERPL-MCNC: 0.73 MG/DL — SIGNIFICANT CHANGE UP (ref 0.5–1.3)
EGFR: 132 ML/MIN/1.73M2 — SIGNIFICANT CHANGE UP
EOSINOPHIL # BLD AUTO: 0.33 K/UL — SIGNIFICANT CHANGE UP (ref 0–0.5)
EOSINOPHIL NFR BLD AUTO: 3.8 % — SIGNIFICANT CHANGE UP (ref 0–6)
GAS PNL BLDV: 134 MMOL/L — LOW (ref 136–145)
GAS PNL BLDV: SIGNIFICANT CHANGE UP
GAS PNL BLDV: SIGNIFICANT CHANGE UP
GLUCOSE BLDV-MCNC: 109 MG/DL — HIGH (ref 70–99)
GLUCOSE SERPL-MCNC: 114 MG/DL — HIGH (ref 70–99)
HAV IGM SER-ACNC: SIGNIFICANT CHANGE UP
HBV CORE IGM SER-ACNC: SIGNIFICANT CHANGE UP
HBV SURFACE AG SER-ACNC: SIGNIFICANT CHANGE UP
HCO3 BLDV-SCNC: 28 MMOL/L — SIGNIFICANT CHANGE UP (ref 22–29)
HCT VFR BLD CALC: 48.5 % — SIGNIFICANT CHANGE UP (ref 39–50)
HCT VFR BLDA CALC: 52 % — HIGH (ref 39–51)
HCV AB S/CO SERPL IA: 0.1 S/CO — SIGNIFICANT CHANGE UP (ref 0–0.99)
HCV AB SERPL-IMP: SIGNIFICANT CHANGE UP
HGB BLD CALC-MCNC: 17.2 G/DL — SIGNIFICANT CHANGE UP (ref 12.6–17.4)
HGB BLD-MCNC: 17 G/DL — SIGNIFICANT CHANGE UP (ref 13–17)
IMM GRANULOCYTES NFR BLD AUTO: 0.5 % — SIGNIFICANT CHANGE UP (ref 0–0.9)
LACTATE BLDV-MCNC: 1.2 MMOL/L — SIGNIFICANT CHANGE UP (ref 0.5–2)
LYMPHOCYTES # BLD AUTO: 1.77 K/UL — SIGNIFICANT CHANGE UP (ref 1–3.3)
LYMPHOCYTES # BLD AUTO: 20.6 % — SIGNIFICANT CHANGE UP (ref 13–44)
MAGNESIUM SERPL-MCNC: 2.2 MG/DL — SIGNIFICANT CHANGE UP (ref 1.6–2.6)
MCHC RBC-ENTMCNC: 28.3 PG — SIGNIFICANT CHANGE UP (ref 27–34)
MCHC RBC-ENTMCNC: 35.1 GM/DL — SIGNIFICANT CHANGE UP (ref 32–36)
MCV RBC AUTO: 80.7 FL — SIGNIFICANT CHANGE UP (ref 80–100)
MONOCYTES # BLD AUTO: 0.56 K/UL — SIGNIFICANT CHANGE UP (ref 0–0.9)
MONOCYTES NFR BLD AUTO: 6.5 % — SIGNIFICANT CHANGE UP (ref 2–14)
NEUTROPHILS # BLD AUTO: 5.85 K/UL — SIGNIFICANT CHANGE UP (ref 1.8–7.4)
NEUTROPHILS NFR BLD AUTO: 68.1 % — SIGNIFICANT CHANGE UP (ref 43–77)
NRBC # BLD: 0 /100 WBCS — SIGNIFICANT CHANGE UP (ref 0–0)
PCO2 BLDV: 41 MMHG — LOW (ref 42–55)
PH BLDV: 7.44 — HIGH (ref 7.32–7.43)
PLATELET # BLD AUTO: 225 K/UL — SIGNIFICANT CHANGE UP (ref 150–400)
PO2 BLDV: 99 MMHG — HIGH (ref 25–45)
POTASSIUM BLDV-SCNC: 3.4 MMOL/L — LOW (ref 3.5–5.1)
POTASSIUM SERPL-MCNC: 3.4 MMOL/L — LOW (ref 3.5–5.3)
POTASSIUM SERPL-SCNC: 3.4 MMOL/L — LOW (ref 3.5–5.3)
PROT SERPL-MCNC: 7.2 G/DL — SIGNIFICANT CHANGE UP (ref 6–8.3)
RBC # BLD: 6.01 M/UL — HIGH (ref 4.2–5.8)
RBC # FLD: 12.5 % — SIGNIFICANT CHANGE UP (ref 10.3–14.5)
SAO2 % BLDV: 99.3 % — HIGH (ref 67–88)
SODIUM SERPL-SCNC: 142 MMOL/L — SIGNIFICANT CHANGE UP (ref 135–145)
WBC # BLD: 8.59 K/UL — SIGNIFICANT CHANGE UP (ref 3.8–10.5)
WBC # FLD AUTO: 8.59 K/UL — SIGNIFICANT CHANGE UP (ref 3.8–10.5)

## 2024-02-18 PROCEDURE — 99222 1ST HOSP IP/OBS MODERATE 55: CPT | Mod: GC

## 2024-02-18 PROCEDURE — 99222 1ST HOSP IP/OBS MODERATE 55: CPT

## 2024-02-18 PROCEDURE — 99233 SBSQ HOSP IP/OBS HIGH 50: CPT

## 2024-02-18 RX ORDER — QUETIAPINE FUMARATE 200 MG/1
25 TABLET, FILM COATED ORAL EVERY 8 HOURS
Refills: 0 | Status: DISCONTINUED | OUTPATIENT
Start: 2024-02-18 | End: 2024-02-18

## 2024-02-18 RX ORDER — DIAZEPAM 5 MG
5 TABLET ORAL EVERY 6 HOURS
Refills: 0 | Status: DISCONTINUED | OUTPATIENT
Start: 2024-02-18 | End: 2024-02-20

## 2024-02-18 RX ORDER — SODIUM CHLORIDE 9 MG/ML
500 INJECTION INTRAMUSCULAR; INTRAVENOUS; SUBCUTANEOUS ONCE
Refills: 0 | Status: COMPLETED | OUTPATIENT
Start: 2024-02-18 | End: 2024-02-18

## 2024-02-18 RX ORDER — BACLOFEN 100 %
5 POWDER (GRAM) MISCELLANEOUS EVERY 8 HOURS
Refills: 0 | Status: DISCONTINUED | OUTPATIENT
Start: 2024-02-18 | End: 2024-02-18

## 2024-02-18 RX ORDER — DIAZEPAM 5 MG
5 TABLET ORAL EVERY 8 HOURS
Refills: 0 | Status: DISCONTINUED | OUTPATIENT
Start: 2024-02-18 | End: 2024-02-18

## 2024-02-18 RX ORDER — MIDAZOLAM HYDROCHLORIDE 1 MG/ML
3 INJECTION, SOLUTION INTRAMUSCULAR; INTRAVENOUS ONCE
Refills: 0 | Status: DISCONTINUED | OUTPATIENT
Start: 2024-02-18 | End: 2024-02-20

## 2024-02-18 RX ORDER — BACLOFEN 100 %
15 POWDER (GRAM) MISCELLANEOUS ONCE
Refills: 0 | Status: COMPLETED | OUTPATIENT
Start: 2024-02-18 | End: 2024-02-18

## 2024-02-18 RX ORDER — BACLOFEN 100 %
10 POWDER (GRAM) MISCELLANEOUS ONCE
Refills: 0 | Status: DISCONTINUED | OUTPATIENT
Start: 2024-02-18 | End: 2024-02-18

## 2024-02-18 RX ORDER — POTASSIUM CHLORIDE 20 MEQ
40 PACKET (EA) ORAL ONCE
Refills: 0 | Status: COMPLETED | OUTPATIENT
Start: 2024-02-18 | End: 2024-02-18

## 2024-02-18 RX ORDER — SODIUM CHLORIDE 9 MG/ML
1000 INJECTION, SOLUTION INTRAVENOUS
Refills: 0 | Status: DISCONTINUED | OUTPATIENT
Start: 2024-02-18 | End: 2024-02-18

## 2024-02-18 RX ORDER — BACLOFEN 100 %
15 POWDER (GRAM) MISCELLANEOUS EVERY 8 HOURS
Refills: 0 | Status: DISCONTINUED | OUTPATIENT
Start: 2024-02-18 | End: 2024-02-20

## 2024-02-18 RX ORDER — BACLOFEN 100 %
5 POWDER (GRAM) MISCELLANEOUS ONCE
Refills: 0 | Status: COMPLETED | OUTPATIENT
Start: 2024-02-18 | End: 2024-02-18

## 2024-02-18 RX ORDER — SODIUM CHLORIDE 9 MG/ML
1000 INJECTION INTRAMUSCULAR; INTRAVENOUS; SUBCUTANEOUS
Refills: 0 | Status: COMPLETED | OUTPATIENT
Start: 2024-02-18 | End: 2024-02-19

## 2024-02-18 RX ORDER — BACLOFEN 100 %
5 POWDER (GRAM) MISCELLANEOUS ONCE
Refills: 0 | Status: DISCONTINUED | OUTPATIENT
Start: 2024-02-18 | End: 2024-02-18

## 2024-02-18 RX ADMIN — Medication 1 DROP(S): at 11:45

## 2024-02-18 RX ADMIN — Medication 100 MILLIGRAM(S): at 11:45

## 2024-02-18 RX ADMIN — Medication 5 MILLIGRAM(S): at 01:18

## 2024-02-18 RX ADMIN — Medication 1 TABLET(S): at 11:45

## 2024-02-18 RX ADMIN — Medication 10 MILLIGRAM(S): at 16:27

## 2024-02-18 RX ADMIN — SODIUM CHLORIDE 200 MILLILITER(S): 9 INJECTION, SOLUTION INTRAVENOUS at 11:45

## 2024-02-18 RX ADMIN — Medication 40 MILLIEQUIVALENT(S): at 13:39

## 2024-02-18 RX ADMIN — SODIUM CHLORIDE 2000 MILLILITER(S): 9 INJECTION INTRAMUSCULAR; INTRAVENOUS; SUBCUTANEOUS at 11:44

## 2024-02-18 RX ADMIN — Medication 15 MILLIGRAM(S): at 03:18

## 2024-02-18 RX ADMIN — Medication 5 MILLIGRAM(S): at 13:39

## 2024-02-18 NOTE — CHART NOTE - NSCHARTNOTEFT_GEN_A_CORE
Pt tolerated baclofen 5mg well, willing to increase dose. Will give additional 15mg to complete 20mg recommended loading dose

## 2024-02-18 NOTE — CONSULT NOTE ADULT - ASSESSMENT
24M with hx of THC/CBG/HHC as well as phenibut use p/w agitation and eye poking in setting of phenibut withdrawal. Pt has been living with mother, having discussions over substance use and last week decided to quit phenibut cold turkey. Has not always been honest regarding substance use with mother in past. Has had insomnia and agitation at home during week. Mother has been giving 6-pack of beer daily to help with symptoms. Patient states that at beginning of year, he purchased 4 handles of liquor and a 24-pack of beer that he went through quickly but otherwise has small amounts of wine occasionally as well. Patient and mother were in middle of argument when patient was gesturing towards his own head with his finger and accidentally poked his right eye. Became concerned over momentary loss of vision and pain. Mother brought him to hospital for further evaluation where phenibut withdrawal symptoms became more apparent. He had reportedly try to titrate off phenibut, and possibly had stopped using it for last 1 week. He took a dose 500 mg on day of admission at 10:30 am. No other reported medications used. No suicidal ideation. Patient wants to get better and eventually be off phenibut. In ED, given Valium 10mg IV x2, 5mg IV x1, ketamine, LR 2L. Patient was seen by Toxicology, who recommended baclofen and Valium taper; patient has been declining Valium pushes although he has been accepting baclofen. Patient evaluated by  who deemed patient was unable to verbalize risks of refusing care at present and lacks capacity to AMA. On floors, patient noted to be more agitated with CIWA of 15. MICU consulted for further management.    #GERMAN withdrawal  - Patient with h/o phenibut use  - At this time, patient is speaking calmly and answering questions appropriately   - Seen by Toxicology, recs appreciated:    - Baclofen 15 mg TID standing, baclofen 5 mg PRN every 1-2 hours for CIWA > 8 and reassess patient 1-2 hours after PRN dose to determine if repeat doses are necessary    - For possible concomitant GERMAN-A agonist withdrawal, give Diazepam 5-10 mg IV PRN if the patient is unable to tolerate oral intake, has CIWA > 15, and/or provider judgement if there is suspicion of GERMAN-A agonist withdrawal    - If no IV access, would recommend IM midazolam or lorazepam instead. Would not recommend diazepam IM as it has erratic absorption via IM route.   - Patient unable to verbalize risks of refusing meds; would ensure patient is being medication appropriately    #HA  - Patient also complaining severe HA, but states that it is waxing/waning  - Can obtain CTH, although patient is now refusing imaging    Patient is not a MICU candidate at this time. Please reconsult if needed.    Discussed with Dr. Knight
Assessment and Recommendations:  23 yo M with polysubstance use presenting to ED for agitation in the setting phenibut withdrawal. Ophthalmology consulted for evaluation of right eye trauma.     #Blunt Trauma, Right Eye  -Patient reports that he was having an argument with his mother and accidentally poked his right eye. Reports that afterwards, he experienced significant pain and mild redness.  -Denies current blurry vision, although feels that left eye might feel sharper than right eye. However, pain nearly resolved and only remains with FBS OD.   -Also reports intermittent flashes while under stress (currently experiencing intermittently) and chronic floaters which have improved  -VA 20/20 OD, 20/20 OS. PERRLA OU with no rAPD. CVF full OU.  -Anterior exam white & quiet. No injection, hyphema, or corneal abrasion noted.   -Fundus exam unremarkable OU. No tears, holes or detachment noted on careful funduscopic exam  -Instructed patient to ensure proper use of protective eyewear when watching eclipses, as not doing so can cause retinal damage.   -Recommend artificial tears QID OU  -Patient to follow-up in the outpatient setting within 1 week     Outpatient follow-up: Patient should follow-up with his/her ophthalmologist or with [facility] Department of Ophthalmology at the address below     [address]  [place] [address]  [phone]    
24 year old male, hx of THC and phenibut use, no reported past medical history, who presents with severe agitation in the setting of sudden cessation of phenibut. He is mildly hyperthermic to 100.6, tachycardic to 126, hypertensive to 160/70. On exam per ED team, he was initially severely agitation, frantically screaming, diaphoretic, with normal sized pupils. EKG and labs are pending. Ketamine was given with moderate improvement.     The patient's history of chronic phenibut use, sudden cessation, and autonomic excitation with agitation, is consistent with GERMAN-B agonist withdrawal. Other toxicological etiologies include alcohol withdrawal, sympathomimetic toxicity, GERMAN-A agonist withdrawal, serotonin toxicity, neuroleptic malignant syndrome, and malignant hyperthermia. Alcohol withdrawal is possible given history of ethanol use. No history of sympathomimetic, GERMAN-A, serotonin agonist, dopamine antagonist, or recent anesthetic use at this time.     GERMAN-B withdrawal is caused by abrupt cessation of an GERMAN-B agonist, such as phenibut, baclofen, GHB, GBL, and 1,4-butandiol. Clinical presentation is similar to GERMAN-A and ethanol withdrawal. Symptoms may include tachycardia, hypertension, hyperthermia, tachypnea, diaphoresis, mydriasis, agitation, anxiety, aggression, hallucinations, seizures, rigidity, and/or hyperreflexia. These manifestations may lead to complications such as rhabdomyolysis, status epilepticus, and severe hyperthermia which may lead to mortality. Treatment is initiation of a long-acting GERMAN agonist and symptom triggered therapy, which is baclofen in GERMAN-B withdrawal, with SBIRT consultation and outpatient follow up.        #Recommendations  - Supportive care and monitor for above toxicities, especially core temperature for hyperthermia  - Obtain routine toxicological labs including CBC, CMP, serum acetaminophen, salicylate, ethanol, CK, troponin and EKG  - Consider medical etiologies such as CNS infection etc.   - If severe agitation and unable to tolerate PO intake, give IV diazepam 5-10 mg PRN  - Baclofen protocol for GERMAN-B withdrawal  If patient has signs and symptoms of GERMAN-B withdrawal, give patient 20mg PO baclofen now and reassess to see if pts symptoms have improved in 1hr, if pt still with significant withdrawal symptoms 1hr after, give another 10mg PO Baclofen. Admit patient and put patient on CIWA protocol. Symptomatically treat patient with baclofen (analogous to stabilizing a patient with benzos for alcohol withdrawal). Goal is to figure out patient’s 24hr baclofen requirement (total baclofen given within 24hrs). Once 24hr requirement obtained, give this over three doses (TID). Continue induction dose for two days. Then gradually taper dose every two days by 5mg. An example is given below.     -If currently stabilized with 20 mg Baclofen PO per day  -Continue Baclofen 20 mg for 2 days from induction  -On days 3-4: give 15 mg Baclofen PO  -On days 5-6: give 10 mg Baclofen PO  -On days 6-7: Give 5 mg Baclofen PO  -On day 8: DC Baclofen    Thank you for involving us in the care of this patient. Assessment and plan discussed with toxicology attending Dr. Dudley Mckeon. Please do not hesitate to reach out to the toxicology team for any further questions or concerns.    The On-Call Toxicology Fellow can be reached 24/7 via Pager #[phone]  Please send a 10 digit call back # as Tate cover multiple hospitals    Ramon Thomas MD  Toxicology Fellow  PGY-5

## 2024-02-18 NOTE — BH CONSULTATION LIAISON ASSESSMENT NOTE - SUMMARY
23yo male, single, unemployed after graduating from college last summer, domiciled with family, no formal psychiatric diagnosis or history but reports substance misuse (THC, synthetic cannabinoids, and phenibut), no prior admissions or SA, no PMHx, brought in by mother due to escalating agitation after cessation of phenibut use. Psychiatry consulted for agitation and substance misuse.    Patient's  23yo male, single, unemployed after graduating from college last summer, domiciled with family, no formal psychiatric diagnosis or history but reports substance misuse (THC, synthetic cannabinoids, and phenibut), no prior admissions or SA, no PMHx, brought in by mother due to escalating agitation after cessation of phenibut use. Psychiatry consulted for agitation and substance misuse. Pt is a 25yo male, single, unemployed after graduating from college last summer, domiciled with family, no formal psychiatric diagnosis or history but reports substance misuse (THC, synthetic cannabinoids, and phenibut), no prior admissions or SA, no PMHx, brought in by mother due to escalating agitation after cessation of phenibut use. Psychiatry consulted for agitation and substance misuse.  Patient has been assessed by toxicology, opthalmology, and medicine. Received diazepam IV 5mg and 10mg x2, as well as ketamine 350mg IM, last night for agitation. No further sedative medications for behavioral agitation today. Currently receiving baclofen treatment for suspected Phenibut withdrawal (affects GERMAN-B receptors) and eye drops to help with right eye abrasion.   Patient in bed, oriented, not hostile, though guarded and paranoid and only superficially cooperative. From get-go notified the writer that he does not trust doctors due to mistreatment from medical system in the past (i.e. involuntary IM medications) and often declined to provide answer to questions. Also stated that he is being kept in the hospital to ensure that his withdrawal is as painful as possible.   Impression: Paranoia secondary to substance use/withdrawal vs underlying psychotic disorder  Would recommend continuing   Valium 5mg IV q8hrs standing and   prns of Valium 5mg IV q6hrs   consider Seroquel 25mg po TID for paranoia  Baclofen as per toxicology (pt only received 10mg today and is agitated, requesting more) Pt is a 23yo male, single, unemployed after graduating from college last summer, domiciled with family, no formal psychiatric diagnosis or history but reports substance misuse (THC, synthetic cannabinoids, and phenibut), no prior admissions or SA, no PMHx, brought in by mother due to escalating agitation after cessation of phenibut use. Psychiatry consulted for agitation and substance misuse.  Patient has been assessed by toxicology, opthalmology, and medicine. Received diazepam IV 5mg and 10mg x2, as well as ketamine 350mg IM, last night for agitation. No further sedative medications for behavioral agitation today. Currently receiving baclofen treatment for suspected Phenibut withdrawal (affects GERMAN-B receptors) and eye drops to help with right eye abrasion.   Patient in bed, oriented, not hostile, though guarded and paranoid and only superficially cooperative. From get-go notified the writer that he does not trust doctors due to mistreatment from medical system in the past (i.e. involuntary IM medications) and often declined to provide answer to questions. Also stated that he is being kept in the hospital to ensure that his withdrawal is as painful as possible. PT is unable to verbalize risks of refusing care at this time and lacks capacity to leave AMA.  Impression: Paranoia secondary to substance use/withdrawal vs underlying psychotic disorder  Would recommend continuing   Valium 5mg IV q8hrs standing and   prns of Valium 5mg IV q6hrs   consider Seroquel 25mg po TID for paranoia if ok with toxicology  Baclofen as per toxicology (pt only received 10mg today and is agitated, requesting more)

## 2024-02-18 NOTE — BH CONSULTATION LIAISON ASSESSMENT NOTE - DESCRIPTION
Graduated from [employer] last summer with bachelors in computer science. Reports finishing 1 year early to save money. Currently unemployed and living at home with his mother, grandmother, and pet bird.

## 2024-02-18 NOTE — BH CONSULTATION LIAISON ASSESSMENT NOTE - SUICIDALITY
90 refill request/verified medication and pharmacy info with pt     Last Appointment:  6/30/2021    Future appts:  Future Appointments   Date Time Provider Pamela Pedrazai   10/7/2021  3:00 PM Bharath Galdamez MD [facility]
OK. Keep FU (with labs if applicable) as directed.
No

## 2024-02-18 NOTE — CONSULT NOTE ADULT - SUBJECTIVE AND OBJECTIVE BOX
CHIEF COMPLAINT:   24M with hx of THC/CBG/HHC as well as phenibut use p/w agitation and eye poking in setting of phenibut withdrawal. Pt has been living with mother, having discussions over substance use and last week decided to quit phenibut cold turkey. Has not always been honest regarding substance use with mother in past. Has had insomnia and agitation at home during week. Mother has been giving 6-pack of beer daily to help with symptoms. Patient states that at beginning of year, he purchased 4 handles of liquor and a 24-pack of beer that he went through quickly but otherwise has small amounts of wine occasionally as well. Patient and mother were in middle of argument when patient was gesturing towards his own head with his finger and accidentally poked his right eye. Became concerned over momentary loss of vision and pain. Mother brought him to hospital for further evaluation where phenibut withdrawal symptoms became more apparent. He had reportedly try to titrate off phenibut, and possibly had stopped using it for last 1 week. He took a dose 500 mg on day of admission at 10:30 am. No other reported medications used. No suicidal ideation. Patient wants to get better and eventually be off phenibut. In ED, given Valium 10mg IV x2, 5mg IV x1, ketamine, LR 2L. Patient was seen by Toxicology, who recommended baclofen and Valium taper; patient has been declining Valium pushes although he has been accepting baclofen. Patient evaluated by  who deemed patient was unable to refuse risks of refusing care at present and lacks capacity to AMA.    On floors, patient noted to be more agitated with CIWA of 15. MICU consulted for further management.          FAMILY HISTORY:  AUD (father)    SOCIAL HISTORY:  Smoking: Former smoker, ~1/2 pack per week  EtOH Use: Recent excess use, and 6-pack per day in setting of withdrawal sxs. Previously social drinker  Occupation: Recent college graduate    Allergies    Motrin (Unknown)  Tree Nuts (Rash)  amoxicillin (Rash)    Intolerances        HOME MEDICATIONS:  None    REVIEW OF SYSTEMS:  CONSTITUTIONAL: No fever, chills, or fatigue  EYES: No visual disturbances  ENMT:  No difficulty hearing, tinnitus, vertigo; No sinus or throat pain  RESPIRATORY: No shortness of breath  CARDIOVASCULAR: No chest pain, palpitations, dizziness  GASTROINTESTINAL: +Nausea. No abdominal or epigastric pain. No vomiting, or hematemesis; No diarrhea or constipation. No melena or hematochezia.  NEUROLOGICAL: +Headache  MUSCULOSKELETAL: No joint pain or swelling; No muscle, back, or extremity pain  PSYCHIATRIC: No SI      OBJECTIVE:  ICU Vital Signs Last 24 Hrs  T(C): 36.7 (18 Feb 2024 14:00), Max: 36.9 (18 Feb 2024 01:12)  T(F): 98 (18 Feb 2024 14:00), Max: 98.4 (18 Feb 2024 01:12)  HR: 110 (18 Feb 2024 17:14) (102 - 125)  BP: 140/88 (18 Feb 2024 17:14) (123/80 - 155/95)  BP(mean): 95 (17 Feb 2024 19:35) (95 - 95)  ABP: --  ABP(mean): --  RR: 18 (18 Feb 2024 17:14) (16 - 18)  SpO2: 97% (18 Feb 2024 17:14) (96% - 99%)    O2 Parameters below as of 18 Feb 2024 17:14  Patient On (Oxygen Delivery Method): room air              02-18 @ 07:01  -  02-18 @ 18:52  --------------------------------------------------------  IN: 240 mL / OUT: 0 mL / NET: 240 mL      CAPILLARY BLOOD GLUCOSE          PHYSICAL EXAM:  GENERAL: Well-developed, laying in bed  HEAD:  Atraumatic, Normocephalic  EYES: Deferred, patient keeping eyes closed  CHEST/LUNG: Clear to auscultation bilaterally; No wheeze  HEART: Tachycardic, regular rhythm; No murmurs, rubs, or gallops  ABDOMEN: Soft, Nontender, Nondistended; Bowel sounds present  EXTREMITIES:  2+ Peripheral Pulses, No clubbing, cyanosis, or edema  PSYCH: AAOx3  NEUROLOGY: non-focal      HOSPITAL MEDICATIONS:  MEDICATIONS  (STANDING):  artificial tears (preservative free) Ophthalmic Solution 1 Drop(s) Both EYES four times a day  baclofen 15 milliGRAM(s) Oral every 8 hours  lactated ringers. 1000 milliLiter(s) (200 mL/Hr) IV Continuous <Continuous>  multivitamin 1 Tablet(s) Oral daily  thiamine 100 milliGRAM(s) Oral daily    MEDICATIONS  (PRN):  acetaminophen     Tablet .. 650 milliGRAM(s) Oral every 6 hours PRN Temp greater or equal to 38C (100.4F), Mild Pain (1 - 3), Moderate Pain (4 - 6), Severe Pain (7 - 10)  diazepam  Injectable 5 milliGRAM(s) IV Push every 6 hours PRN agitation  melatonin 3 milliGRAM(s) Oral at bedtime PRN Insomnia      LABS:                        17.0   8.59  )-----------( 225      ( 18 Feb 2024 10:26 )             48.5     02-18    142  |  102  |  7   ----------------------------<  114<H>  3.4<L>   |  24  |  0.73    Ca    9.7      18 Feb 2024 10:26  Mg     2.2     02-18    TPro  7.2  /  Alb  4.5  /  TBili  1.4<H>  /  DBili  0.2  /  AST  36  /  ALT  40  /  AlkPhos  53  02-18      Urinalysis Basic - ( 18 Feb 2024 10:26 )    Color: x / Appearance: x / SG: x / pH: x  Gluc: 114 mg/dL / Ketone: x  / Bili: x / Urobili: x   Blood: x / Protein: x / Nitrite: x   Leuk Esterase: x / RBC: x / WBC x   Sq Epi: x / Non Sq Epi: x / Bacteria: x        Venous Blood Gas:  02-18 @ 10:22  7.44/41/99/28/99.3  VBG Lactate: 1.2  Venous Blood Gas:  02-17 @ 17:49  7.44/39/72/26/95.6  VBG Lactate: 1.4      MICROBIOLOGY:     RADIOLOGY:  [ ] Reviewed and interpreted by me    EKG:
MEDICAL TOXICOLOGY CONSULT    HPI: 24 year old male, hx of THC and phenibut use, no reported past medical history, who presents with severe agitation. History by ED team via family member. Patient has history of chronic phenibut use, unclear dosage per day, as well as concentrated THC and ethanol use. He had reportedly try to titrate off phenibut, and possibly had stopped using it for last 1 week. He took a dose 500 mg today around 10:30 am. No other reported medications used. No suicide attempt.     ONSET / TIME of exposure(s): chronic    QUANTITY of exposure(s): unknown    ROUTE of exposure:  INGESTION    CONTEXT of exposure: at home      ASSOCIATED symptoms: agitation    PAST MEDICAL & SURGICAL HISTORY: none      MEDICATION HISTORY:  lactated ringers Bolus 1000 milliLiter(s) IV Bolus once      FAMILY HISTORY: n/a      REVIEW OF SYSTEMS:  unable to perform due to intoxication, dementia, or illness      Vital Signs Last 24 Hrs  T(C): --  T(F): --  HR: 130 (17 Feb 2024 14:26) (130 - 130)  BP: 164/102 (17 Feb 2024 14:26) (164/102 - 164/102)  BP(mean): --  RR: 22 (17 Feb 2024 14:26) (22 - 22)  SpO2: 99% (17 Feb 2024 14:26) (99% - 99%)    Parameters below as of 17 Feb 2024 14:26  Patient On (Oxygen Delivery Method): nasal cannula  O2 Flow (L/min): 2      SIGNIFICANT LABORATORY STUDIES:                        17.7   13.67 )-----------( 351      ( 17 Feb 2024 13:42 )             50.5       02-17    137  |  94<L>  |  8   ----------------------------<  184<H>  3.5   |  16<L>  |  0.98    Ca    10.4      17 Feb 2024 13:42    TPro  8.1  /  Alb  5.0  /  TBili  1.4<H>  /  DBili  x   /  AST  42<H>  /  ALT  46<H>  /  AlkPhos  60  02-17          Urinalysis Basic - ( 17 Feb 2024 13:42 )    Color: x / Appearance: x / SG: x / pH: x  Gluc: 184 mg/dL / Ketone: x  / Bili: x / Urobili: x   Blood: x / Protein: x / Nitrite: x   Leuk Esterase: x / RBC: x / WBC x   Sq Epi: x / Non Sq Epi: x / Bacteria: x        Anion Gap: 27<H> 02-17 @ 13:42  CK: 787<H> 02-17 @ 13:42  Troponin:  --  02-17 @ 13:42  Pro-BNP:  --  02-17 @ 13:42  VBG:  --  02-17 @ 13:42  Carboxyhemoglobin %:  --  02-17 @ 13:42  Methemoglobin %:  --  02-17 @ 13:42  Osmolality Serum:  --  02-17 @ 13:42  Aspirin Level: --  02-17 @ 13:42  Acetaminophen Level:  --  02-17 @ 13:42  Ethanol Level:  --  02-17 @ 13:42  Digoxin Level:  --  02-17 @ 13:42  Phenytoin Level:  --  02-17 @ 13:42  Carbamazepine level:  --  02-17 @ 13:42  Lamotrigine level:  --  02-17 @ 13:42    
[facility] DEPARTMENT OF OPHTHALMOLOGY - INITIAL ADULT CONSULT  ----------------------------------------------------------------------------------------------------  Alla Christianson MD, PGY-2  -------------------------------------------------------------------------------------------------    HPI:  24M w/ hx of THC/ CBG/ HHC as well as phenibut use p/w agitation and eye poking in setting of phenibut withdrawal. Pt has been living with mother, having discussions over substance use and last week decided to quit phenibut cold turkey. Has not always been honest regarding substance use with mother in past. Has had insomnia and agitation at home during week. Mother has been giving 6 packs of beer daily to help with symptoms. Small amounts of wine occasionally as well. Patient and mother were in middle of argument when patient was gesturing towards his own head with his finger and accidentally poked his right eye. Became concerned over momentary loss of vision and pain. Mother brought him to hospital for further evaluation where phenibut withdrawal symptoms became more apparent. He had reportedly try to titrate off phenibut, and possibly had stopped using it for last 1 week. He took a dose 500 mg today around 10:30 am. No other reported medications used. No suicide attempt. Patient wants to get better and eventually be off phenibut.    In ER: Given Valium 10mg IV x2, 5mg IV x1, ketamine, LR 2L,  (17 Feb 2024 21:28)    Interval History: Patient reports that yesterday he was having argument with his mother and accidentally poked his right eye. Afterwards, he experienced significant pain with mild redness. Today, states that pain is nearly resolved - has residual mild FBS. No blurry vision, but reports that left eye appears sharper than right eye.  Reports intermittent flashes in both eyes when he is under stress, currently experiencing them from time to time. No curtain over vision. Also reports floaters when he his full prescription, thus has been trying to reduce his prescription. Also reports that in 2016, he started into the eclipse without protective glasses and is concerned he may have damaged his eyes.     PAST MEDICAL & SURGICAL HISTORY:  Anxiety      Polysubstance use disorder        Past Ocular History: None  Ophthalmic Medications: None  FAMILY HISTORY: No glaucoma or ARMD      MEDICATIONS  (STANDING):  multivitamin 1 Tablet(s) Oral daily  thiamine 100 milliGRAM(s) Oral daily    MEDICATIONS  (PRN):  acetaminophen     Tablet .. 650 milliGRAM(s) Oral every 6 hours PRN Temp greater or equal to 38C (100.4F), Mild Pain (1 - 3), Moderate Pain (4 - 6), Severe Pain (7 - 10)  melatonin 3 milliGRAM(s) Oral at bedtime PRN Insomnia    Allergies & Intolerances:   Motrin (Unknown)  Tree Nuts (Rash)  amoxicillin (Rash)    Review of Systems:  Constitutional: No fever, chills  Eyes: No blurry vision, flashes, floaters, FBS, erythema, discharge, double vision, OU  Neuro: No tremors  Cardiovascular: No chest pain, palpitations  Respiratory: No SOB, no cough  GI: No nausea, vomiting, abdominal pain  : No dysuria  Skin: no rash  Psych: no depression  Endocrine: no polyuria, polydipsia  Heme/lymph: no swelling    VITALS: T(C): 36.6 (02-18-24 @ 07:31)  T(F): 97.9 (02-18-24 @ 07:31), Max: 100.6 (02-17-24 @ 15:45)  HR: 125 (02-18-24 @ 07:31) (109 - 130)  BP: 155/95 (02-18-24 @ 07:31) (123/80 - 164/102)  RR:  (16 - 22)  SpO2:  (96% - 99%)  Wt(kg): --  General: AAO x 3, appropriate mood and affect    Ophthalmology Exam:  Visual acuity (sc): 20/20 OD, 20/20 OS  Pupils: PERRL OU, no APD  Ttono: STP OU  Extraocular movements (EOMs): Full OU, no pain, no diplopia  Confrontational Visual Field (CVF): Full OU  Color Plates: 12/12 OU    Pen Light Exam (PLE)  External: Flat OU  Lids/Lashes/Lacrimal Ducts: Flat OU    Sclera/Conjunctiva: W+Q OU. Nasal pigmentation OS.   Cornea: DTBUT OU  Anterior Chamber: D+F OU    Iris: Flat OU  Lens: Cl OU    Fundus Exam: dilated with 1% tropicamide and 2.5% phenylephrine  Approval obtained from primary team for dilation  Patient aware that pupils can remained dilated for at least 4-6 hours  Exam performed with 20D lens    Vitreous: wnl OU  Disc, cup/disc: sharp and pink, 0.3 OU  Macula: wnl OU  Vessels: wnl OU  Periphery: wnl OU

## 2024-02-18 NOTE — PROGRESS NOTE ADULT - SUBJECTIVE AND OBJECTIVE BOX
Sujit Mendez   contact via pager or TEAMS        CC: Patient is a 24y old  Male who presents with a chief complaint of Severe agitation and phenibut withdrawal (18 Feb 2024 09:34)      SUBJECTIVE / OVERNIGHT EVENTS:    MEDICATIONS  (STANDING):  artificial tears (preservative free) Ophthalmic Solution 1 Drop(s) Both EYES four times a day  multivitamin 1 Tablet(s) Oral daily  thiamine 100 milliGRAM(s) Oral daily    MEDICATIONS  (PRN):  acetaminophen     Tablet .. 650 milliGRAM(s) Oral every 6 hours PRN Temp greater or equal to 38C (100.4F), Mild Pain (1 - 3), Moderate Pain (4 - 6), Severe Pain (7 - 10)  melatonin 3 milliGRAM(s) Oral at bedtime PRN Insomnia      Vital Signs Last 24 Hrs  T(C): 36.6 (18 Feb 2024 07:31), Max: 38.1 (17 Feb 2024 15:45)  T(F): 97.9 (18 Feb 2024 07:31), Max: 100.6 (17 Feb 2024 15:45)  HR: 125 (18 Feb 2024 07:31) (109 - 130)  BP: 155/95 (18 Feb 2024 07:31) (123/80 - 164/102)  BP(mean): 95 (17 Feb 2024 19:35) (95 - 95)  RR: 18 (18 Feb 2024 07:31) (16 - 22)  SpO2: 98% (18 Feb 2024 07:31) (96% - 99%)  CAPILLARY BLOOD GLUCOSE        I&O's Summary    tele:    PHYSICAL EXAM:    GENERAL: NAD   HEENT: EOMI, PERRL  PULM: Clear to auscultation bilaterally  CV: Regular rate and rhythm; nl S1, S2; No murmurs, rubs, or gallops  ABDOMEN: Soft, Nontender, Nondistended; Bowel sounds present  EXTREMITIES/MSK:  No edema, calf tenderness   PSYCH: AAOx3  NEUROLOGY: non-focal          LABS:                        17.7   13.67 )-----------( 351      ( 17 Feb 2024 13:42 )             50.5     02-17    140  |  101  |  8   ----------------------------<  94  3.6   |  24  |  0.79    Ca    9.6      17 Feb 2024 17:59    TPro  8.1  /  Alb  5.0  /  TBili  1.4<H>  /  DBili  x   /  AST  42<H>  /  ALT  46<H>  /  AlkPhos  60  02-17      CARDIAC MARKERS ( 17 Feb 2024 13:42 )  x     / x     / 787 U/L / x     / x          Urinalysis Basic - ( 17 Feb 2024 17:59 )    Color: x / Appearance: x / SG: x / pH: x  Gluc: 94 mg/dL / Ketone: x  / Bili: x / Urobili: x   Blood: x / Protein: x / Nitrite: x   Leuk Esterase: x / RBC: x / WBC x   Sq Epi: x / Non Sq Epi: x / Bacteria: x          RADIOLOGY & ADDITIONAL TESTS:    Imaging Personally Reviewed:    Consultant(s) Notes Reviewed:      Care Discussed with Consultants/Other Providers:   Sujit Mendez   contact via pager or TEAMS        CC: Patient is a 24y old  Male who presents with a chief complaint of Severe agitation and phenibut withdrawal (18 Feb 2024 09:34)      SUBJECTIVE / OVERNIGHT EVENTS: chart reviewed. sig improvement in sx per pt and mother. denies any myalgias or weakness. Only sx today is anxiety    MEDICATIONS  (STANDING):  artificial tears (preservative free) Ophthalmic Solution 1 Drop(s) Both EYES four times a day  multivitamin 1 Tablet(s) Oral daily  thiamine 100 milliGRAM(s) Oral daily    MEDICATIONS  (PRN):  acetaminophen     Tablet .. 650 milliGRAM(s) Oral every 6 hours PRN Temp greater or equal to 38C (100.4F), Mild Pain (1 - 3), Moderate Pain (4 - 6), Severe Pain (7 - 10)  melatonin 3 milliGRAM(s) Oral at bedtime PRN Insomnia      Vital Signs Last 24 Hrs  T(C): 36.6 (18 Feb 2024 07:31), Max: 38.1 (17 Feb 2024 15:45)  T(F): 97.9 (18 Feb 2024 07:31), Max: 100.6 (17 Feb 2024 15:45)  HR: 125 (18 Feb 2024 07:31) (109 - 130)  BP: 155/95 (18 Feb 2024 07:31) (123/80 - 164/102)  BP(mean): 95 (17 Feb 2024 19:35) (95 - 95)  RR: 18 (18 Feb 2024 07:31) (16 - 22)  SpO2: 98% (18 Feb 2024 07:31) (96% - 99%)  CAPILLARY BLOOD GLUCOSE        I&O's Summary    tele: sr/st    PHYSICAL EXAM:    GENERAL: NAD   HEENT: EOMI, PERRL  PULM: Clear to auscultation bilaterally  CV: Regular rate and rhythm; nl S1, S2; No murmurs, rubs, or gallops  ABDOMEN: Soft, Nontender, Nondistended; Bowel sounds present  EXTREMITIES/MSK:  No edema, calf tenderness. hand tremors with arms outstretched. CIWA 6  PSYCH: AAOx3  NEUROLOGY: cn 2-12 intact b/l; 5/5 motor b/l; sensation intact in b/l ext          LABS:                        17.7   13.67 )-----------( 351      ( 17 Feb 2024 13:42 )             50.5     02-17    140  |  101  |  8   ----------------------------<  94  3.6   |  24  |  0.79    Ca    9.6      17 Feb 2024 17:59    TPro  8.1  /  Alb  5.0  /  TBili  1.4<H>  /  DBili  x   /  AST  42<H>  /  ALT  46<H>  /  AlkPhos  60  02-17      CARDIAC MARKERS ( 17 Feb 2024 13:42 )  x     / x     / 787 U/L / x     / x          Urinalysis Basic - ( 17 Feb 2024 17:59 )    Color: x / Appearance: x / SG: x / pH: x  Gluc: 94 mg/dL / Ketone: x  / Bili: x / Urobili: x   Blood: x / Protein: x / Nitrite: x   Leuk Esterase: x / RBC: x / WBC x   Sq Epi: x / Non Sq Epi: x / Bacteria: x          RADIOLOGY & ADDITIONAL TESTS:    Imaging Personally Reviewed:    Consultant(s) Notes Reviewed:      Care Discussed with Consultants/Other Providers: ABHI/riley

## 2024-02-18 NOTE — BH CONSULTATION LIAISON ASSESSMENT NOTE - HPI (INCLUDE ILLNESS QUALITY, SEVERITY, DURATION, TIMING, CONTEXT, MODIFYING FACTORS, ASSOCIATED SIGNS AND SYMPTOMS)
25yo male, single, unemployed after graduating from college last summer, domiciled with family, no formal psychiatric diagnosis or history but reports substance misuse (THC, synthetic cannabinoids, and phenibut), no prior admissions or SA, no PMHx, brought in by mother due to escalating agitation after cessation of phenibut use. Psychiatry consulted for agitation and substance misuse.    After arrival to ED yesterday, patient has been assessed by toxicology, opthalmology, and medicine. Received diazepam IV 5mg and 10mg x2, as well as ketamine 350mg IM, last night for agitation. No further sedative medications for behavioral agitation today. Currently receiving baclofen treatment for suspected phenibut withdrawal (affects GERMAN-B receptors) and eye drops to help with right eye abrasion.     Patient seen in ED by writer. Patient in bed, oriented, not hostile, though guarded and only superficially cooperative. From get-go notified the writer that he does not trust doctors due to mistreatment from medical system in the past (i.e. involuntary IM medications) and often declined to provide answer to questions. Pt is a 23yo male, single, unemployed after graduating from college last summer, domiciled with family, no formal psychiatric diagnosis or history but reports substance misuse (THC, synthetic cannabinoids, and phenibut), no prior admissions or SA, no PMHx, brought in by mother due to escalating agitation after cessation of phenibut use. Psychiatry consulted for agitation and substance misuse.    After arrival to ED yesterday, patient has been assessed by toxicology, opthalmology, and medicine. Received diazepam IV 5mg and 10mg x2, as well as ketamine 350mg IM, last night for agitation. No further sedative medications for behavioral agitation today. Currently receiving baclofen treatment for suspected phenibut withdrawal (affects GERMAN-B receptors) and eye drops to help with right eye abrasion.     Patient seen in ED by writer. Patient in bed, oriented, not hostile, though guarded and paranoid and only superficially cooperative. From get-go notified the writer that he does not trust doctors due to mistreatment from medical system in the past (i.e. involuntary IM medications) and often declined to provide answer to questions. Also stated that he is being kept in the hospital to ensure that his withdrawal is as painful as possible.  He is upset that he is not getting enough Baclofen and believes that he is not being treated adequately.

## 2024-02-18 NOTE — CHART NOTE - NSCHARTNOTEFT_GEN_A_CORE
Patient is a 24y old  Male who presents with a chief complaint of Severe agitation and phenibut withdrawal (18 Feb 2024 18:51)  Called by RN at 1600 concerned pt is agitated; refusing to take his valium.  He only wants baclofen.  Spoke with the pt at the bedside with   the medical attending. Pt was very agitated. Again questioned the medications and drugs that he took prior to coming to the hospital.   Pt wanted to leave the hospital AMA, however Psychiatry indicated pt does not have capacity to sign out ama.     HPI:    Vital Signs Last 24 Hrs  T(C): 36.7 (18 Feb 2024 14:00), Max: 36.9 (18 Feb 2024 01:12)  T(F): 98 (18 Feb 2024 14:00), Max: 98.4 (18 Feb 2024 01:12)  HR: 110 (18 Feb 2024 17:14) (102 - 125)  BP: 140/88 (18 Feb 2024 17:14) (123/80 - 155/95)  BP(mean): --  RR: 18 (18 Feb 2024 17:14) (16 - 18)  SpO2: 97% (18 Feb 2024 17:14) (96% - 98%)    Parameters below as of 18 Feb 2024 17:14  Patient On (Oxygen Delivery Method): room air    Gen: Pt very agitated and fluctuating with brief periods of being calm.   Pt would yell out that no one is giving him the medication "Baclofen" that he want.   VS: Pt with HR fluctuating from 110-125 bpm.   Found at times yelling then taking deep breaths to calm himself down.     Laboratory:  CARDIAC MARKERS ( 18 Feb 2024 10:26 )  x     / x     / 936 U/L / x     / x      CARDIAC MARKERS ( 17 Feb 2024 13:42 )  x     / x     / 787 U/L / x     / x                                17.0   8.59  )-----------( 225      ( 18 Feb 2024 10:26 )             48.5       Impression:   Penulit Withdrawal    Plan:   Spoke with Toxicology at length.  MICU consult was called when pt became very agitated and began complaining of 10/10 headaches.   Pt was seen by Medical Attending Dr. Stroud.  CT Head was ordered. Pt refused to go.   Attending also spoke with pt to try to explain to him   in addition to the baclofen he would also benefit with iv diazepam.  Pt refused.  MICU consult much appreciated. Pt will remain on constant observation.   CK also gradually rising.  Spoke with the nurse along with the oncoming ACP regarding symptoms to evaluate for.  (Please refer to toxicology's note).   Pt will be monitored closely. Mother was spoken to by the Medical and ICU team.     Trista Schmidt ANP-[facility] #[id number]

## 2024-02-18 NOTE — BH CONSULTATION LIAISON ASSESSMENT NOTE - RISK ASSESSMENT
pt is at risk for agitation and aggressiveness because of his substance withdrawal.  He denies any history of suicidality.

## 2024-02-18 NOTE — BH CONSULTATION LIAISON ASSESSMENT NOTE - NSBHCHARTREVIEWLAB_PSY_A_CORE FT
LABS:                      17.0   8.59  )-----------( 225      ( 18 Feb 2024 10:26 )             48.5     02-18    142  |  102  |  7   ----------------------------<  114<H>  3.4<L>   |  24  |  0.73    Ca    9.7      18 Feb 2024 10:26  Mg     2.2     02-18    TPro  7.2  /  Alb  4.5  /  TBili  1.4<H>  /  DBili  x   /  AST  36  /  ALT  40  /  AlkPhos  53  02-18    LIVER FUNCTIONS - ( 18 Feb 2024 10:26 )  Alb: 4.5 g/dL / Pro: 7.2 g/dL / ALK PHOS: 53 U/L / ALT: 40 U/L / AST: 36 U/L / GGT: x             Urinalysis Basic - ( 18 Feb 2024 10:26 )    Color: x / Appearance: x / SG: x / pH: x  Gluc: 114 mg/dL / Ketone: x  / Bili: x / Urobili: x   Blood: x / Protein: x / Nitrite: x   Leuk Esterase: x / RBC: x / WBC x   Sq Epi: x / Non Sq Epi: x / Bacteria: x

## 2024-02-18 NOTE — BH CONSULTATION LIAISON ASSESSMENT NOTE - OTHER
Guarded and superficially cooperative. Non-hostile to provider but easily irritable.  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 At times pausing to think through answer and several times asking what question I asked.

## 2024-02-18 NOTE — BH CONSULTATION LIAISON ASSESSMENT NOTE - NSBHATTESTCOMMENTATTENDFT_PSY_A_CORE
Pt is a 23yo male, single, unemployed after graduating from college last summer, domiciled with family, no formal psychiatric diagnosis or history but reports substance misuse (THC, synthetic cannabinoids, and phenibut), no prior admissions or SA, no PMHx, brought in by mother due to escalating agitation after cessation of phenibut use. Psychiatry consulted for agitation and substance misuse.  Patient has been assessed by toxicology, opthalmology, and medicine. He appears irritable and paranoid in need of medication.  Agree with recommendations to continue the Valium and consider Seroquel for paranoia, and Baclofen as per toxicology service.  Pt is a 25yo male, single, unemployed after graduating from college last summer, domiciled with family, no formal psychiatric diagnosis or history but reports substance misuse (THC, synthetic cannabinoids, and phenibut), no prior admissions or SA, no PMHx, brought in by mother due to escalating agitation after cessation of phenibut use. Psychiatry consulted for agitation and substance misuse.   Patient has been assessed by toxicology, opthalmology, and medicine. He appears irritable and paranoid in need of medication.  Agree with recommendations to continue the Valium and Baclofen, and consider Seroquel as per toxicology service. PT is unable to verbalize risks of refusing care at this time and lacks capacity to leave AMA.

## 2024-02-18 NOTE — BH CONSULTATION LIAISON ASSESSMENT NOTE - CURRENT MEDICATION
MEDICATIONS  (STANDING):  artificial tears (preservative free) Ophthalmic Solution 1 Drop(s) Both EYES four times a day  baclofen 5 milliGRAM(s) Oral once  baclofen 5 milliGRAM(s) Oral once  lactated ringers. 1000 milliLiter(s) (200 mL/Hr) IV Continuous <Continuous>  multivitamin 1 Tablet(s) Oral daily  thiamine 100 milliGRAM(s) Oral daily    MEDICATIONS  (PRN):  acetaminophen     Tablet .. 650 milliGRAM(s) Oral every 6 hours PRN Temp greater or equal to 38C (100.4F), Mild Pain (1 - 3), Moderate Pain (4 - 6), Severe Pain (7 - 10)  melatonin 3 milliGRAM(s) Oral at bedtime PRN Insomnia

## 2024-02-18 NOTE — CHART NOTE - NSCHARTNOTEFT_GEN_A_CORE
called to see pt for a severe 10/10 HA. neg brudzinski. on neuro exam pt Cn 2-12 intact, motor and sensation intact b/l. stat head CT ordered, discussed with CT techs and mother/patient are agreeable understanding risks of radiation exposure. ICU called for GERMAN withdrawal and pt refusing Valium treatment as discussed with tox. d/w ACP called to see pt for a severe 10/10 HA. neg brudzinski. on neuro exam pt Cn 2-12 intact, motor and sensation intact b/l. stat head CT ordered, discussed with CT techs and mother/patient are agreeable understanding risks of radiation exposure. ICU called for symptoms possibly related to GERMAN withdrawal, elevated CIWA and pt refusing Valium treatment (thinks it will cause him to develop a worse addiction) as discussed with tox. d/w ACP

## 2024-02-18 NOTE — BH CONSULTATION LIAISON ASSESSMENT NOTE - NSBHCHARTREVIEWVS_PSY_A_CORE FT
Vital Signs Last 24 Hrs  T(C): 36.5 (18 Feb 2024 12:08), Max: 38.1 (17 Feb 2024 15:45)  T(F): 97.7 (18 Feb 2024 12:08), Max: 100.6 (17 Feb 2024 15:45)  HR: 113 (18 Feb 2024 12:08) (108 - 130)  BP: 132/87 (18 Feb 2024 12:08) (123/80 - 164/102)  BP(mean): 95 (17 Feb 2024 19:35) (95 - 95)  RR: 18 (18 Feb 2024 12:08) (16 - 22)  SpO2: 96% (18 Feb 2024 12:08) (96% - 99%)    Parameters below as of 18 Feb 2024 12:08  Patient On (Oxygen Delivery Method): room air

## 2024-02-18 NOTE — CONSULT NOTE ADULT - ATTENDING COMMENTS
24M Hx ETOH and Polysubstance Abuse (THC/CBG/HHC and Phenibut) admitted for GERMAN-A Agonist Withdrawal and Accidental Rt Eye Poking Injury yesterday seen by Toxicology with recommended Valium prn and Baclofen Tapering Protocol. MICU consult called for CIWA 15 since patient is refusing Valium and  Consult also seen and directed as incapacity for signing AMA.   - Pt seen and evaluated at bedside   - A&O x 3 and FC x 4 answering questions calmly   - CIWA <3 on 1 : 1 Observation   - Continue Benzodiazepine and Baclofen regimen per Toxicology   - No ICU observation and monitoring indicated 24M Hx ETOH and Polysubstance Abuse (THC/CBG/HHC and Phenibut) admitted for GERMAN-A Agonist Withdrawal and Accidental Rt Eye Poking Injury yesterday seen by Toxicology with recommended Valium prn and Baclofen Tapering Protocol. MICU consult called for CIWA 15 since patient is refusing Valium and  Consult also seen and directed as incapacity for signing AMA.   - Pt seen and evaluated at bedside   - A&O x 3 and FC x 4 answering questions calmly   - CIWA <3 on 1 : 1 Observation   - Continue Benzodiazepine and Baclofen regimen per Toxicology   - No ICU observation and monitoring indicated     Patient seen and examined with ICU Resident/Fellow at bedside after lab data, medical records and radiology reports reviewed. I have read and agreeable in general with resident's Documented Note, Assessment and Management Plan which reflected my opinions from bedside round and discussion. 24M Hx ETOH and Polysubstance Abuse (THC/CBG/HHC and Phenibut) admitted for GERMAN-B Agonist Withdrawal and Accidental Rt Eye Poking Injury yesterday seen by Toxicology with recommended Valium prn and Baclofen Tapering Protocol. MICU consult called for CIWA 15 since patient is refusing Valium and  Consult also seen and directed as incapacity for signing AMA.   - Pt seen and evaluated at bedside   - A&O x 3 and FC x 4 answering questions calmly   - CIWA <3 on 1 : 1 Observation   - Continue Benzodiazepine and Baclofen regimen per Toxicology   - No ICU observation and monitoring indicated     Patient seen and examined with ICU Resident/Fellow at bedside after lab data, medical records and radiology reports reviewed. I have read and agreeable in general with resident's Documented Note, Assessment and Management Plan which reflected my opinions from bedside round and discussion.

## 2024-02-19 ENCOUNTER — TRANSCRIPTION ENCOUNTER (OUTPATIENT)
Age: 24
End: 2024-02-19

## 2024-02-19 DIAGNOSIS — R78.89 FINDING OF OTHER SPECIFIED SUBSTANCES, NOT NORMALLY FOUND IN BLOOD: ICD-10-CM

## 2024-02-19 LAB
ANION GAP SERPL CALC-SCNC: 14 MMOL/L — SIGNIFICANT CHANGE UP (ref 5–17)
BUN SERPL-MCNC: 8 MG/DL — SIGNIFICANT CHANGE UP (ref 7–23)
CALCIUM SERPL-MCNC: 10 MG/DL — SIGNIFICANT CHANGE UP (ref 8.4–10.5)
CHLORIDE SERPL-SCNC: 102 MMOL/L — SIGNIFICANT CHANGE UP (ref 96–108)
CK SERPL-CCNC: 552 U/L — HIGH (ref 30–200)
CO2 SERPL-SCNC: 25 MMOL/L — SIGNIFICANT CHANGE UP (ref 22–31)
CREAT SERPL-MCNC: 0.8 MG/DL — SIGNIFICANT CHANGE UP (ref 0.5–1.3)
EGFR: 128 ML/MIN/1.73M2 — SIGNIFICANT CHANGE UP
GLUCOSE SERPL-MCNC: 111 MG/DL — HIGH (ref 70–99)
HCT VFR BLD CALC: 50.7 % — HIGH (ref 39–50)
HGB BLD-MCNC: 17.9 G/DL — HIGH (ref 13–17)
MCHC RBC-ENTMCNC: 28.6 PG — SIGNIFICANT CHANGE UP (ref 27–34)
MCHC RBC-ENTMCNC: 35.3 GM/DL — SIGNIFICANT CHANGE UP (ref 32–36)
MCV RBC AUTO: 81 FL — SIGNIFICANT CHANGE UP (ref 80–100)
NRBC # BLD: 0 /100 WBCS — SIGNIFICANT CHANGE UP (ref 0–0)
PLATELET # BLD AUTO: 254 K/UL — SIGNIFICANT CHANGE UP (ref 150–400)
POTASSIUM SERPL-MCNC: 3.9 MMOL/L — SIGNIFICANT CHANGE UP (ref 3.5–5.3)
POTASSIUM SERPL-SCNC: 3.9 MMOL/L — SIGNIFICANT CHANGE UP (ref 3.5–5.3)
RBC # BLD: 6.26 M/UL — HIGH (ref 4.2–5.8)
RBC # FLD: 12.4 % — SIGNIFICANT CHANGE UP (ref 10.3–14.5)
SODIUM SERPL-SCNC: 141 MMOL/L — SIGNIFICANT CHANGE UP (ref 135–145)
WBC # BLD: 9.9 K/UL — SIGNIFICANT CHANGE UP (ref 3.8–10.5)
WBC # FLD AUTO: 9.9 K/UL — SIGNIFICANT CHANGE UP (ref 3.8–10.5)

## 2024-02-19 PROCEDURE — 99231 SBSQ HOSP IP/OBS SF/LOW 25: CPT

## 2024-02-19 PROCEDURE — 99232 SBSQ HOSP IP/OBS MODERATE 35: CPT

## 2024-02-19 RX ORDER — SODIUM CHLORIDE 9 MG/ML
1000 INJECTION INTRAMUSCULAR; INTRAVENOUS; SUBCUTANEOUS
Refills: 0 | Status: DISCONTINUED | OUTPATIENT
Start: 2024-02-19 | End: 2024-02-20

## 2024-02-19 RX ORDER — BACLOFEN 100 %
1 POWDER (GRAM) MISCELLANEOUS
Qty: 8 | Refills: 0
Start: 2024-02-19 | End: 2024-02-20

## 2024-02-19 RX ADMIN — SODIUM CHLORIDE 150 MILLILITER(S): 9 INJECTION INTRAMUSCULAR; INTRAVENOUS; SUBCUTANEOUS at 23:15

## 2024-02-19 NOTE — DISCHARGE NOTE PROVIDER - NSFOLLOWUPCLINICS_GEN_ALL_ED_FT
[facility]  Psychiatry  [address] [place] [address]  Phone: [phone]  Fax:

## 2024-02-19 NOTE — PROVIDER CONTACT NOTE (OTHER) - REASON
refusing telemetry and medications
Pt noncompliant with telemetry, Pt refusing all medications, CT head and removed IV lock
Patient refused morning labs

## 2024-02-19 NOTE — PROGRESS NOTE ADULT - ASSESSMENT
22 minute discussion had between myself, Dr. Albert Garrido, patient, and mother, Dr. Hoffman, regarding the patient’s medical care thus far and moving forward. Patient refusing Baclofen medication in the hospital stating that he wants to go home and that the 15 mg dose makes him feel “stupid.” Mom agrees and would like to take the patient home as she feels that he is becoming more agitated and restless in the hospital versus at home. She reports that she will take charge of administering a Baclofen taper at home as well as monitor for signs and symptoms of worsening withdrawal, including but not limited, worsening agitation, diaphoresis, seizure like activity. If any of the above occur or any other concerning symptoms, they are to return to the ED if for further evaluation. Patient is almost 24 hours from last Baclofen dose, with improved vital signs, and CIWA of 4. Patient can cleared from an acute toxicologic standpoint to initiate home Baclofen taper. Mom has verbalized understanding.     Baclofen Home Taper.   - 5 mg Baclofen q6h x 2 days   - 5mg Baclofen q8h x 2 days   - 5 mg Baclofen q12h x2 days    Defer any psychiatric consideration and plan in the presentation of this patient to the psychiatry.     Thank you for involving us in the care of this patient. Assessment and plan discussed with toxicology attending Dr. Albert Garrido. Please do not hesitate to reach out to the toxicology team for any further questions or concerns.    The On-Call Toxicology Fellow can be reached 24/7 via Pager #[phone]  Please send a 10 digit call back # as [place] cover multiple hospitals    Abner Reynaga MD  Toxicology Fellow  PGY-4   22 minute discussion had between myself, Dr. Albert Garrido, patient, and mother, Dr. Hoffman, regarding the patient’s medical care thus far and moving forward. Patient refusing Baclofen medication in the hospital stating that he wants to go home and that the 15 mg dose makes him feel “stupid.” Mom agrees and would like to take the patient home as she feels that he is becoming more agitated and restless in the hospital versus at home. She reports that she will take charge of administering a Baclofen taper at home as well as monitor for signs and symptoms of worsening withdrawal, including but not limited, worsening agitation, diaphoresis, seizure like activity. If any of the above occur or any other concerning symptoms, they are to return to the ED if for further evaluation. Patient is almost 24 hours from last Baclofen dose, with improved vital signs, and CIWA of 4. Patient can cleared from an acute toxicologic standpoint to initiate home Baclofen taper. Mom has verbalized understanding.     Baclofen Home Taper.   - 5 mg Baclofen q6h x 2 days   - 5mg Baclofen q8h x 2 days   - 5 mg Baclofen q12h x2 days    Defer any psychiatric consideration and plan in the presentation of this patient to the psychiatry team.     Thank you for involving us in the care of this patient. Assessment and plan discussed with toxicology attending Dr. Albert Garrido. Please do not hesitate to reach out to the toxicology team for any further questions or concerns.    The On-Call Toxicology Fellow can be reached 24/7 via Pager #[phone]  Please send a 10 digit call back # as [place] cover multiple hospitals    Abner Reynaga MD  Toxicology Fellow  PGY-4

## 2024-02-19 NOTE — PROVIDER CONTACT NOTE (OTHER) - SITUATION
Pt noncompliant with telemetry, Pt refusing all medications, CT head and removed IV lock
Patient refused morning labs
refusing telemetry and medications despite encouragement and education.

## 2024-02-19 NOTE — DISCHARGE NOTE PROVIDER - NSDCMRMEDTOKEN_GEN_ALL_CORE_FT
baclofen 5 mg oral tablet: 1 tab(s) orally every 6 hours followed by 5mg q 8hrs x 2 days, followed by 5 mg q 12 x 2 days  baclofen 5 mg oral tablet: 1 tab(s) orally every 8 hours  baclofen 5 mg oral tablet: 1 tab(s) orally every 12 hours

## 2024-02-19 NOTE — PROGRESS NOTE ADULT - PROBLEM SELECTOR PLAN 8
Takes HHC, CBG, THC at home. Had significant reactions to marijuana and edibles in past.  -F/u psych recommendations
outpt f/u

## 2024-02-19 NOTE — BH CONSULTATION LIAISON PROGRESS NOTE - NSBHCONSULTFOLLOWAFTERCARE_PSY_A_CORE FT
can f/u [facility] clinic once cleared medically as needed  can f/u [facility] clinic once cleared medically as needed, pt continues to lack capacity to leave AMA, defer to HCP

## 2024-02-19 NOTE — DISCHARGE NOTE PROVIDER - ATTENDING DISCHARGE PHYSICAL EXAMINATION:
T(C): 36.7 (02-20-24 @ 10:34), Max: 37.1 (02-19-24 @ 17:30)  T(F): 98 (02-20-24 @ 10:34), Max: 98.7 (02-19-24 @ 17:30)  HR: 104 (02-20-24 @ 10:34) (91 - 140)  BP: 137/87 (02-20-24 @ 10:34) (135/93 - 145/90)  RR: 18 (02-20-24 @ 10:34) (18 - 18)  SpO2: 95% (02-20-24 @ 10:34) (95% - 98%)  Wt(kg): --    GENERAL: Well appearing, in NAD  EYES: EOMI, conjunctiva and sclera clear  ENT: moist mucosa, no pharyngeal erythema  NECK: supple, no JVD  LUNG: Clear to auscultation bilaterally; No rales, rhonchi, wheezing, or rubs.   CVS: Regular rate and rhythm; No murmurs, rubs, or gallops  ABDOMEN: Soft, non tender, nondistended. +Bowel sounds.  EXTREMITIES:  no edema, no cyanosis  NEURO:  Alert & Oriented X3,  No focal deficits  PSYCH: Normal affect, normal mood  MUSCULOSKELETAL: FROM, no joint swelling  Skin: warm and dry, normal color

## 2024-02-19 NOTE — PROVIDER CONTACT NOTE (OTHER) - BACKGROUND
patient admitted for restlessness and agitation
pt admitted with restlessness and agitation
Pt admitted for restlessness and agitation. On CIWA protocol

## 2024-02-19 NOTE — PROGRESS NOTE ADULT - SUBJECTIVE AND OBJECTIVE BOX
Sujit Mendez   contact via pager or TEAMS        CC: Patient is a 24y old  Male who presents with a chief complaint of Severe agitation and phenibut withdrawal (18 Feb 2024 18:51)      SUBJECTIVE / OVERNIGHT EVENTS:    MEDICATIONS  (STANDING):  artificial tears (preservative free) Ophthalmic Solution 1 Drop(s) Both EYES four times a day  baclofen 15 milliGRAM(s) Oral every 8 hours  midazolam Injectable 3 milliGRAM(s) IntraMuscular once  multivitamin 1 Tablet(s) Oral daily  sodium chloride 0.9%. 1000 milliLiter(s) (150 mL/Hr) IV Continuous <Continuous>  thiamine 100 milliGRAM(s) Oral daily    MEDICATIONS  (PRN):  acetaminophen     Tablet .. 650 milliGRAM(s) Oral every 6 hours PRN Temp greater or equal to 38C (100.4F), Mild Pain (1 - 3), Moderate Pain (4 - 6), Severe Pain (7 - 10)  diazepam  Injectable 5 milliGRAM(s) IV Push every 6 hours PRN agitation  melatonin 3 milliGRAM(s) Oral at bedtime PRN Insomnia      Vital Signs Last 24 Hrs  T(C): 36.8 (19 Feb 2024 11:32), Max: 36.8 (19 Feb 2024 02:54)  T(F): 98.3 (19 Feb 2024 11:32), Max: 98.3 (19 Feb 2024 11:32)  HR: 93 (19 Feb 2024 11:32) (93 - 123)  BP: 152/93 (19 Feb 2024 11:32) (125/78 - 152/97)  BP(mean): --  RR: 18 (19 Feb 2024 11:32) (18 - 18)  SpO2: 95% (19 Feb 2024 11:32) (95% - 98%)  CAPILLARY BLOOD GLUCOSE        I&O's Summary    18 Feb 2024 07:01  -  19 Feb 2024 07:00  --------------------------------------------------------  IN: 240 mL / OUT: 0 mL / NET: 240 mL    19 Feb 2024 07:01  -  19 Feb 2024 12:58  --------------------------------------------------------  IN: 120 mL / OUT: 0 mL / NET: 120 mL      tele:    PHYSICAL EXAM:    GENERAL: NAD   HEENT: EOMI, PERRL  PULM: Clear to auscultation bilaterally  CV: Regular rate and rhythm; nl S1, S2; No murmurs, rubs, or gallops  ABDOMEN: Soft, Nontender, Nondistended; Bowel sounds present  EXTREMITIES/MSK:  No edema, calf tenderness   PSYCH: AAOx3  NEUROLOGY: non-focal          LABS:                        17.9   9.90  )-----------( 254      ( 19 Feb 2024 10:07 )             50.7     02-19    141  |  102  |  8   ----------------------------<  111<H>  3.9   |  25  |  0.80    Ca    10.0      19 Feb 2024 10:07  Mg     2.2     02-18    TPro  7.2  /  Alb  4.5  /  TBili  1.4<H>  /  DBili  0.2  /  AST  36  /  ALT  40  /  AlkPhos  53  02-18      CARDIAC MARKERS ( 19 Feb 2024 10:07 )  x     / x     / 552 U/L / x     / x      CARDIAC MARKERS ( 18 Feb 2024 10:26 )  x     / x     / 936 U/L / x     / x      CARDIAC MARKERS ( 17 Feb 2024 13:42 )  x     / x     / 787 U/L / x     / x          Urinalysis Basic - ( 19 Feb 2024 10:07 )    Color: x / Appearance: x / SG: x / pH: x  Gluc: 111 mg/dL / Ketone: x  / Bili: x / Urobili: x   Blood: x / Protein: x / Nitrite: x   Leuk Esterase: x / RBC: x / WBC x   Sq Epi: x / Non Sq Epi: x / Bacteria: x          RADIOLOGY & ADDITIONAL TESTS:    Imaging Personally Reviewed:    Consultant(s) Notes Reviewed:      Care Discussed with Consultants/Other Providers:   Sujit Mendez   contact via pager or TEAMS        CC: Patient is a 24y old  Male who presents with a chief complaint of Severe agitation and phenibut withdrawal (18 Feb 2024 18:51)      SUBJECTIVE / OVERNIGHT EVENTS: pt and mother want pt to leave, understanding the risks. Mother told me that psych consult deemed him to have capacity to leave AMA but Dr. Adams stated that she did not assess him for that. Pt denies any MAJANO, tremors, N/V. Has mild anxiety. Has not taken any Baclofen since yest stating that his symptoms have resolved    MEDICATIONS  (STANDING):  artificial tears (preservative free) Ophthalmic Solution 1 Drop(s) Both EYES four times a day  baclofen 15 milliGRAM(s) Oral every 8 hours  midazolam Injectable 3 milliGRAM(s) IntraMuscular once  multivitamin 1 Tablet(s) Oral daily  sodium chloride 0.9%. 1000 milliLiter(s) (150 mL/Hr) IV Continuous <Continuous>  thiamine 100 milliGRAM(s) Oral daily    MEDICATIONS  (PRN):  acetaminophen     Tablet .. 650 milliGRAM(s) Oral every 6 hours PRN Temp greater or equal to 38C (100.4F), Mild Pain (1 - 3), Moderate Pain (4 - 6), Severe Pain (7 - 10)  diazepam  Injectable 5 milliGRAM(s) IV Push every 6 hours PRN agitation  melatonin 3 milliGRAM(s) Oral at bedtime PRN Insomnia      Vital Signs Last 24 Hrs  T(C): 36.8 (19 Feb 2024 11:32), Max: 36.8 (19 Feb 2024 02:54)  T(F): 98.3 (19 Feb 2024 11:32), Max: 98.3 (19 Feb 2024 11:32)  HR: 93 (19 Feb 2024 11:32) (93 - 123)  BP: 152/93 (19 Feb 2024 11:32) (125/78 - 152/97)  BP(mean): --  RR: 18 (19 Feb 2024 11:32) (18 - 18)  SpO2: 95% (19 Feb 2024 11:32) (95% - 98%)  CAPILLARY BLOOD GLUCOSE        I&O's Summary    18 Feb 2024 07:01  -  19 Feb 2024 07:00  --------------------------------------------------------  IN: 240 mL / OUT: 0 mL / NET: 240 mL    19 Feb 2024 07:01  -  19 Feb 2024 12:58  --------------------------------------------------------  IN: 120 mL / OUT: 0 mL / NET: 120 mL      tele: nsr    PHYSICAL EXAM:    GENERAL: NAD   HEENT: EOMI, PERRL  PULM: Clear to auscultation bilaterally  CV: Regular rate and rhythm; nl S1, S2; No murmurs, rubs, or gallops  ABDOMEN: Soft, Nontender, Nondistended; Bowel sounds present  EXTREMITIES/MSK:  No edema, calf tenderness   PSYCH: AAOx3  NEUROLOGY: non-focal; no tremors          LABS:                        17.9   9.90  )-----------( 254      ( 19 Feb 2024 10:07 )             50.7     02-19    141  |  102  |  8   ----------------------------<  111<H>  3.9   |  25  |  0.80    Ca    10.0      19 Feb 2024 10:07  Mg     2.2     02-18    TPro  7.2  /  Alb  4.5  /  TBili  1.4<H>  /  DBili  0.2  /  AST  36  /  ALT  40  /  AlkPhos  53  02-18      CARDIAC MARKERS ( 19 Feb 2024 10:07 )  x     / x     / 552 U/L / x     / x      CARDIAC MARKERS ( 18 Feb 2024 10:26 )  x     / x     / 936 U/L / x     / x      CARDIAC MARKERS ( 17 Feb 2024 13:42 )  x     / x     / 787 U/L / x     / x          Urinalysis Basic - ( 19 Feb 2024 10:07 )    Color: x / Appearance: x / SG: x / pH: x  Gluc: 111 mg/dL / Ketone: x  / Bili: x / Urobili: x   Blood: x / Protein: x / Nitrite: x   Leuk Esterase: x / RBC: x / WBC x   Sq Epi: x / Non Sq Epi: x / Bacteria: x          RADIOLOGY & ADDITIONAL TESTS:    Imaging Personally Reviewed:    Consultant(s) Notes Reviewed:      Care Discussed with Consultants/Other Providers: psych, ACP

## 2024-02-19 NOTE — DISCHARGE NOTE PROVIDER - NSDCCPCAREPLAN_GEN_ALL_CORE_FT
PRINCIPAL DISCHARGE DIAGNOSIS  Diagnosis: Substance abuse requiring supervised withdrawal  Assessment and Plan of Treatment: see an addiction specialist as soon as possible. call your doctor immediately with any changes to your health. call police if there is any threat of harm      SECONDARY DISCHARGE DIAGNOSES  Diagnosis: Rhabdomyolysis  Assessment and Plan of Treatment: repeat cpk tomorrow. make sure to stay hydrated    Diagnosis: Elevated beta-hydroxybutyrate  Assessment and Plan of Treatment: repeat beta-hydroxybutyrate tomorrow.    Diagnosis: Elevated liver enzymes  Assessment and Plan of Treatment: repeat blood work tomorrow    Diagnosis: Elevated red blood cell count  Assessment and Plan of Treatment: repeat blood work tomorrow    Diagnosis: Abrasion of eye  Assessment and Plan of Treatment: see an eye doctor as soon as possible. cont artificial tears     PRINCIPAL DISCHARGE DIAGNOSIS  Diagnosis: Substance abuse requiring supervised withdrawal  Assessment and Plan of Treatment: see an addiction specialist as soon as possible. call your doctor immediately with any changes to your health. call police if there is any threat of harm      SECONDARY DISCHARGE DIAGNOSES  Diagnosis: Rhabdomyolysis  Assessment and Plan of Treatment: repeat cpk tomorrow. make sure to stay hydrated    Diagnosis: Elevated beta-hydroxybutyrate  Assessment and Plan of Treatment: repeat beta-hydroxybutyrate tomorrow.    Diagnosis: Elevated liver enzymes  Assessment and Plan of Treatment: repeat blood work tomorrow    Diagnosis: Elevated red blood cell count  Assessment and Plan of Treatment: repeat blood work tomorrow    Diagnosis: Disorder of penis  Assessment and Plan of Treatment: outpt f/u scheduled with neuro and urology    Diagnosis: Abrasion of eye  Assessment and Plan of Treatment: see an eye doctor as soon as possible. cont artificial tears     PRINCIPAL DISCHARGE DIAGNOSIS  Diagnosis: Substance abuse requiring supervised withdrawal  Assessment and Plan of Treatment: see an addiction specialist as soon as possible. call your doctor immediately with any changes to your health. call police if there is any threat of harm. Please follow-up with your primary care physician within one week of discharge. Please follow up with outpatient psychiatry.      SECONDARY DISCHARGE DIAGNOSES  Diagnosis: Rhabdomyolysis  Assessment and Plan of Treatment: repeat cpk tomorrow. make sure to stay hydrated. cpk downtrending    Diagnosis: Elevated beta-hydroxybutyrate  Assessment and Plan of Treatment: repeat beta-hydroxybutyrate tomorrow.    Diagnosis: Elevated liver enzymes  Assessment and Plan of Treatment: repeat blood work tomorrow    Diagnosis: Elevated red blood cell count  Assessment and Plan of Treatment: repeat blood work tomorrow    Diagnosis: Abrasion of eye  Assessment and Plan of Treatment: see an eye doctor as soon as possible. cont artificial tears    Diagnosis: Disorder of penis  Assessment and Plan of Treatment: outpt f/u scheduled with neuro and urology

## 2024-02-19 NOTE — PROGRESS NOTE ADULT - PROBLEM SELECTOR PLAN 7
S/p accidental finger poke of R eye, endorses preserved vision, minimal pain  -Opthalmology consult
S/p accidental finger poke of R eye, endorses preserved vision, minimal pain  -outpt ophtho f/u

## 2024-02-19 NOTE — CHART NOTE - NSCHARTNOTEFT_GEN_A_CORE
Patient evaluated today by medicine, toxicology, and psychiatry about leaving the hospital AMA with consent from mother as HCP. Patient now hesitant to allow mother to sign his paperwork as he is concerned with future implications in his medical records. Pt now requesting second psychiatry opinion. Mother also adds that he wants to eat healthy and is very food-conscious. Will follow up with psych in AM. Please maintain safety precautions. Dr. Mendez informed.

## 2024-02-19 NOTE — PROVIDER CONTACT NOTE (OTHER) - ASSESSMENT
Pt A&Ox4, complaining of pain in CASIMIRO, says due to IV, requesting to have it removed, spoke with Patient regarding importance of IV insertion, Pt removed it anyways. Pt refusing all medications, says we are trying to hurt him. Pt refused CT-scan of the head. Educated Patient on importance of plan of care, Pt still refusing
Patient refused morning labs  AO4, VSS, no c/o pain or discomfort

## 2024-02-19 NOTE — PROGRESS NOTE ADULT - PROBLEM SELECTOR PLAN 5
pt reports h/o penile numbness and has seen a Urologist who ordered a pelvic MRI to evaluate. Mother also reports that they have a neurologist appt to evaluate
pt reports h/o penile numbness and has seen a Urologist who ordered a pelvic MRI to evaluate. Mother also reports that they have a neurologist appt to evaluate

## 2024-02-19 NOTE — BH CONSULTATION LIAISON PROGRESS NOTE - NSBHFUPINTERVALHXFT_PSY_A_CORE
pt demanding to go home, upset with his care, feels he needs more baclofen. denies current withdrawal symptoms. no prns overnight. denies depression, psychosis, anxiety. denies si/hi. mother at bedside, who is a physician, feels pt is better, back to baseline.

## 2024-02-19 NOTE — PROVIDER CONTACT NOTE (OTHER) - ACTION/TREATMENT ORDERED:
Primary team aware. No changes to plan of care at this time.
provider aware  patient and family educated; patient verbalized understanding  Will continue with current POC and update as needed.
continue to encourage compliance

## 2024-02-19 NOTE — BH CONSULTATION LIAISON PROGRESS NOTE - CURRENT MEDICATION
MEDICATIONS  (STANDING):  artificial tears (preservative free) Ophthalmic Solution 1 Drop(s) Both EYES four times a day  baclofen 15 milliGRAM(s) Oral every 8 hours  midazolam Injectable 3 milliGRAM(s) IntraMuscular once  multivitamin 1 Tablet(s) Oral daily  sodium chloride 0.9%. 1000 milliLiter(s) (150 mL/Hr) IV Continuous <Continuous>  thiamine 100 milliGRAM(s) Oral daily    MEDICATIONS  (PRN):  acetaminophen     Tablet .. 650 milliGRAM(s) Oral every 6 hours PRN Temp greater or equal to 38C (100.4F), Mild Pain (1 - 3), Moderate Pain (4 - 6), Severe Pain (7 - 10)  diazepam  Injectable 5 milliGRAM(s) IV Push every 6 hours PRN agitation  melatonin 3 milliGRAM(s) Oral at bedtime PRN Insomnia

## 2024-02-19 NOTE — PROGRESS NOTE ADULT - PROBLEM SELECTOR PLAN 2
pt/mother explained signs/symptoms of withdrawal and to return to hospital if anything changes
Pt waxing and waning episodes of agitation thought to be 2/2 combination of withdrawal and paranoia.   -Currently improved. Psych consult to d/c 1:1  -Cont. 1:1 for now

## 2024-02-19 NOTE — DISCHARGE NOTE PROVIDER - HOSPITAL COURSE
24M w/ hx of THC/ CBG/ HHC as well as phenibut use p/w agitation and eye poking in setting of possible phenibut withdrawal. Pt was seen by tox, psych and MICU. During hospitalization patient had outbursts of anger towards treating teams stating that he would be better off at home and he can manage any withdrawal at home. Periodically refused treatment, imaging, tele, IVL. Psych deemed him to not have capacity to leave AMA but stated that he did not need psych admission. Mother agreed to be responsible for him at home and was aware of all the risks. Mother will sign him out AMA; on-call  for [facility] was agreeable with plan 24M w/ hx of THC/ CBG/ HHC as well as phenibut use p/w agitation and eye poking in setting of possible phenibut withdrawal. Pt was seen by tox, psych and MICU. During hospitalization patient had outbursts of anger towards treating teams stating that he would be better off at home and he can manage any withdrawal at home. Periodically refused treatment, imaging, tele, IVL. Psych deemed him to not have capacity to leave AMA but stated that he did not need psych admission. Mother agreed to be responsible for him at home and was aware of all the risks. Mother will sign him out AMA; on-call  for [facility] was agreeable with plan. Mother declines to sign out patient AMA per his request. Patient stayed overnight. 2/20 patient out of window for acute medical decompensation from withdrawal as per discussion with toxicology team. Per psychiatry discussion do not start any psychiatric medications for discharge, follow up outpatient.    Discharge planning discussed with attending Dr. Merlos. Patient is medically cleared and stable for discharge home with baclofen taper as recommended by toxicology. Medications at [facility]. Medication Reconciliation reviewed with attending. Follow up outpatient with your PCP and psychiatry.

## 2024-02-19 NOTE — PROGRESS NOTE ADULT - REASON FOR ADMISSION
Severe agitation and phenibut withdrawal

## 2024-02-19 NOTE — BH CONSULTATION LIAISON PROGRESS NOTE - NSBHASSESSMENTFT_PSY_ALL_CORE
Summary (brief):	Pt is a 23yo male, single, unemployed after graduating from college last summer, domiciled with family, no formal psychiatric diagnosis or history but reports substance misuse (THC, synthetic cannabinoids, and phenibut), no prior admissions or SA, no PMHx, brought in by mother due to escalating agitation after cessation of phenibut use. Psychiatry consulted for agitation and substance misuse.  Patient has been assessed by toxicology, opthalmology, and medicine. Received diazepam IV 5mg and 10mg x2, as well as ketamine 350mg IM, last night for agitation. No further sedative medications for behavioral agitation today. Currently receiving baclofen treatment for suspected Phenibut withdrawal (affects GERMAN-B receptors) and eye drops to help with right eye abrasion.

## 2024-02-19 NOTE — PROGRESS NOTE ADULT - PROBLEM SELECTOR PLAN 4
Note slight transaminitis and CK elevation. Possibly in setting of recent ETOH/ substance use?  -Trend CMP  -f/u hepatitis panel
mother will f/u with blood work tomorrow

## 2024-02-19 NOTE — DISCHARGE NOTE PROVIDER - NSDCFUADDAPPT_GEN_ALL_CORE_FT
APPTS ARE READY TO BE MADE: [x ] YES    Best Family or Patient Contact (if needed):    Additional Information about above appointments (if needed):    1:   2:   3:     Other comments or requests:    APPTS ARE READY TO BE MADE: [x ] YES    Best Family or Patient Contact (if needed):    Additional Information about above appointments (if needed):    1:   2:   3:     Other comments or requests:     Provided patient with provider referral information, however patient prefers to schedule the appointments on their own.

## 2024-02-19 NOTE — BH CONSULTATION LIAISON PROGRESS NOTE - NSBHCHARTREVIEWLAB_PSY_A_CORE FT
17.9   9.90  )-----------( 254      ( 19 Feb 2024 10:07 )             50.7     02-19    141  |  102  |  8   ----------------------------<  111<H>  3.9   |  25  |  0.80    Ca    10.0      19 Feb 2024 10:07  Mg     2.2     02-18    TPro  7.2  /  Alb  4.5  /  TBili  1.4<H>  /  DBili  0.2  /  AST  36  /  ALT  40  /  AlkPhos  53  02-18

## 2024-02-19 NOTE — PROGRESS NOTE ADULT - PROBLEM SELECTOR PLAN 1
pt is in withdrawal. Appreciate toxicology recommendations, pt likely suffering from GERMAN-B withdrawal. Recommend baclofen protocol for GERMAN-B withdrawal  -CIWA  - Baclofen taper per tox  -Goal taper below if symptoms controlled with baclofen 20mg:  -Continue Baclofen 20 mg for 2 days from induction  -On days 3-4: give 15 mg Baclofen PO  -On days 5-6: give 10 mg Baclofen PO  -On days 6-7: Give 5 mg Baclofen PO  -On day 8: DC Baclofen  -F/u toxicology recommendations  -Would consult inpatient psych in AM to help with capacity  -Please see provider handoff
will d/w legal if mother can sign pt out AMA. pt/mother explained signs/symptoms of withdrawal and to return to hospital if anything changes

## 2024-02-19 NOTE — BH CONSULTATION LIAISON PROGRESS NOTE - NSBHCHARTREVIEWVS_PSY_A_CORE FT
Vital Signs Last 24 Hrs  T(C): 36.8 (19 Feb 2024 11:32), Max: 36.8 (19 Feb 2024 02:54)  T(F): 98.3 (19 Feb 2024 11:32), Max: 98.3 (19 Feb 2024 11:32)  HR: 93 (19 Feb 2024 11:32) (93 - 123)  BP: 152/93 (19 Feb 2024 11:32) (125/78 - 152/97)  BP(mean): --  RR: 18 (19 Feb 2024 11:32) (18 - 18)  SpO2: 95% (19 Feb 2024 11:32) (95% - 98%)    Parameters below as of 19 Feb 2024 11:32  Patient On (Oxygen Delivery Method): room air

## 2024-02-20 ENCOUNTER — TRANSCRIPTION ENCOUNTER (OUTPATIENT)
Age: 24
End: 2024-02-20

## 2024-02-20 VITALS
HEART RATE: 104 BPM | DIASTOLIC BLOOD PRESSURE: 87 MMHG | TEMPERATURE: 98 F | RESPIRATION RATE: 18 BRPM | SYSTOLIC BLOOD PRESSURE: 137 MMHG | OXYGEN SATURATION: 95 %

## 2024-02-20 LAB
ALBUMIN SERPL ELPH-MCNC: 4.6 G/DL — SIGNIFICANT CHANGE UP (ref 3.3–5)
ALP SERPL-CCNC: 60 U/L — SIGNIFICANT CHANGE UP (ref 40–120)
ALT FLD-CCNC: 55 U/L — HIGH (ref 10–45)
ANION GAP SERPL CALC-SCNC: -18 MMOL/L — LOW (ref 5–17)
AST SERPL-CCNC: 34 U/L — SIGNIFICANT CHANGE UP (ref 10–40)
B-OH-BUTYR SERPL-SCNC: 0.1 MMOL/L — SIGNIFICANT CHANGE UP
BASOPHILS # BLD AUTO: 0.04 K/UL — SIGNIFICANT CHANGE UP (ref 0–0.2)
BASOPHILS NFR BLD AUTO: 0.4 % — SIGNIFICANT CHANGE UP (ref 0–2)
BILIRUB SERPL-MCNC: 1.5 MG/DL — HIGH (ref 0.2–1.2)
BUN SERPL-MCNC: 9 MG/DL — SIGNIFICANT CHANGE UP (ref 7–23)
CALCIUM SERPL-MCNC: 10.2 MG/DL — SIGNIFICANT CHANGE UP (ref 8.4–10.5)
CHLORIDE SERPL-SCNC: 114 MMOL/L — HIGH (ref 96–108)
CK SERPL-CCNC: 481 U/L — HIGH (ref 30–200)
CO2 SERPL-SCNC: 26 MMOL/L — SIGNIFICANT CHANGE UP (ref 22–31)
CREAT SERPL-MCNC: 0.86 MG/DL — SIGNIFICANT CHANGE UP (ref 0.5–1.3)
EGFR: 126 ML/MIN/1.73M2 — SIGNIFICANT CHANGE UP
EOSINOPHIL # BLD AUTO: 0.36 K/UL — SIGNIFICANT CHANGE UP (ref 0–0.5)
EOSINOPHIL NFR BLD AUTO: 3.9 % — SIGNIFICANT CHANGE UP (ref 0–6)
GLUCOSE SERPL-MCNC: 125 MG/DL — HIGH (ref 70–99)
HCT VFR BLD CALC: 50.8 % — HIGH (ref 39–50)
HGB BLD-MCNC: 18.4 G/DL — HIGH (ref 13–17)
IMM GRANULOCYTES NFR BLD AUTO: 0.3 % — SIGNIFICANT CHANGE UP (ref 0–0.9)
LYMPHOCYTES # BLD AUTO: 1.73 K/UL — SIGNIFICANT CHANGE UP (ref 1–3.3)
LYMPHOCYTES # BLD AUTO: 18.5 % — SIGNIFICANT CHANGE UP (ref 13–44)
MCHC RBC-ENTMCNC: 28.8 PG — SIGNIFICANT CHANGE UP (ref 27–34)
MCHC RBC-ENTMCNC: 36.2 GM/DL — HIGH (ref 32–36)
MCV RBC AUTO: 79.6 FL — LOW (ref 80–100)
MONOCYTES # BLD AUTO: 0.55 K/UL — SIGNIFICANT CHANGE UP (ref 0–0.9)
MONOCYTES NFR BLD AUTO: 5.9 % — SIGNIFICANT CHANGE UP (ref 2–14)
NEUTROPHILS # BLD AUTO: 6.62 K/UL — SIGNIFICANT CHANGE UP (ref 1.8–7.4)
NEUTROPHILS NFR BLD AUTO: 71 % — SIGNIFICANT CHANGE UP (ref 43–77)
NRBC # BLD: 0 /100 WBCS — SIGNIFICANT CHANGE UP (ref 0–0)
PLATELET # BLD AUTO: 244 K/UL — SIGNIFICANT CHANGE UP (ref 150–400)
POTASSIUM SERPL-MCNC: 3.2 MMOL/L — LOW (ref 3.5–5.3)
POTASSIUM SERPL-SCNC: 3.2 MMOL/L — LOW (ref 3.5–5.3)
PROT SERPL-MCNC: 7.4 G/DL — SIGNIFICANT CHANGE UP (ref 6–8.3)
RBC # BLD: 6.38 M/UL — HIGH (ref 4.2–5.8)
RBC # FLD: 12.5 % — SIGNIFICANT CHANGE UP (ref 10.3–14.5)
SODIUM SERPL-SCNC: 122 MMOL/L — LOW (ref 135–145)
WBC # BLD: 9.33 K/UL — SIGNIFICANT CHANGE UP (ref 3.8–10.5)
WBC # FLD AUTO: 9.33 K/UL — SIGNIFICANT CHANGE UP (ref 3.8–10.5)

## 2024-02-20 PROCEDURE — 96374 THER/PROPH/DIAG INJ IV PUSH: CPT

## 2024-02-20 PROCEDURE — 84132 ASSAY OF SERUM POTASSIUM: CPT

## 2024-02-20 PROCEDURE — 85025 COMPLETE CBC W/AUTO DIFF WBC: CPT

## 2024-02-20 PROCEDURE — 85027 COMPLETE CBC AUTOMATED: CPT

## 2024-02-20 PROCEDURE — 84484 ASSAY OF TROPONIN QUANT: CPT

## 2024-02-20 PROCEDURE — 99285 EMERGENCY DEPT VISIT HI MDM: CPT

## 2024-02-20 PROCEDURE — 82435 ASSAY OF BLOOD CHLORIDE: CPT

## 2024-02-20 PROCEDURE — 83605 ASSAY OF LACTIC ACID: CPT

## 2024-02-20 PROCEDURE — 80074 ACUTE HEPATITIS PANEL: CPT

## 2024-02-20 PROCEDURE — 80053 COMPREHEN METABOLIC PANEL: CPT

## 2024-02-20 PROCEDURE — 82947 ASSAY GLUCOSE BLOOD QUANT: CPT

## 2024-02-20 PROCEDURE — 84295 ASSAY OF SERUM SODIUM: CPT

## 2024-02-20 PROCEDURE — 85018 HEMOGLOBIN: CPT

## 2024-02-20 PROCEDURE — 80307 DRUG TEST PRSMV CHEM ANLYZR: CPT

## 2024-02-20 PROCEDURE — 82330 ASSAY OF CALCIUM: CPT

## 2024-02-20 PROCEDURE — 80048 BASIC METABOLIC PNL TOTAL CA: CPT

## 2024-02-20 PROCEDURE — 85014 HEMATOCRIT: CPT

## 2024-02-20 PROCEDURE — 82010 KETONE BODYS QUAN: CPT

## 2024-02-20 PROCEDURE — 87637 SARSCOV2&INF A&B&RSV AMP PRB: CPT

## 2024-02-20 PROCEDURE — 82550 ASSAY OF CK (CPK): CPT

## 2024-02-20 PROCEDURE — 82248 BILIRUBIN DIRECT: CPT

## 2024-02-20 PROCEDURE — 96372 THER/PROPH/DIAG INJ SC/IM: CPT

## 2024-02-20 PROCEDURE — 82803 BLOOD GASES ANY COMBINATION: CPT

## 2024-02-20 PROCEDURE — 36415 COLL VENOUS BLD VENIPUNCTURE: CPT

## 2024-02-20 PROCEDURE — 96375 TX/PRO/DX INJ NEW DRUG ADDON: CPT

## 2024-02-20 PROCEDURE — 83735 ASSAY OF MAGNESIUM: CPT

## 2024-02-20 NOTE — DISCHARGE NOTE NURSING/CASE MANAGEMENT/SOCIAL WORK - NSDCFUADDAPPT_GEN_ALL_CORE_FT
APPTS ARE READY TO BE MADE: [x ] YES    Best Family or Patient Contact (if needed):    Additional Information about above appointments (if needed):    1:   2:   3:     Other comments or requests:     Provided patient with provider referral information, however patient prefers to schedule the appointments on their own.

## 2024-02-20 NOTE — DISCHARGE NOTE NURSING/CASE MANAGEMENT/SOCIAL WORK - NSDCPEFALRISK_GEN_ALL_CORE
For information on Fall & Injury Prevention, visit: [email or web address] OR  [email or web address] OR  https://www.cdc.gov/steadi/patient.html

## 2024-02-20 NOTE — DISCHARGE NOTE NURSING/CASE MANAGEMENT/SOCIAL WORK - PATIENT PORTAL LINK FT
You can access the FollowMyHealth Patient Portal offered by [facility] by registering at the following website: [email or web address]. By joining [employer]’s FollowMyHealth portal, you will also be able to view your health information using other applications (apps) compatible with our system.

## 2024-02-21 RX ORDER — BACLOFEN 100 %
1 POWDER (GRAM) MISCELLANEOUS
Qty: 6 | Refills: 0
Start: 2024-02-21 | End: 2024-02-22

## 2024-02-22 LAB
AMPHET UR-MCNC: NEGATIVE NG/ML — SIGNIFICANT CHANGE UP
BARBITURATES UR QL SCN: NEGATIVE NG/ML — SIGNIFICANT CHANGE UP
BARBITURATES UR-MCNC: NEGATIVE NG/ML — SIGNIFICANT CHANGE UP
BENZODIAZ UR-MCNC: NEGATIVE NG/ML — SIGNIFICANT CHANGE UP
CANNABINOID RESULT, UR: NEGATIVE — SIGNIFICANT CHANGE UP
CANNABINOIDS UR QL SCN: NEGATIVE — SIGNIFICANT CHANGE UP
COCAINE METAB.OTHER UR-MCNC: NEGATIVE NG/ML — SIGNIFICANT CHANGE UP
CREATININE, URINE THERAPEUTIC: 22.1 MG/DL — SIGNIFICANT CHANGE UP (ref 20–300)
FENTANYL RESULT, UR: NEGATIVE NG/ML — SIGNIFICANT CHANGE UP
FENTANYL UR QL SCN: NEGATIVE NG/ML — SIGNIFICANT CHANGE UP
Lab: SIGNIFICANT CHANGE UP
METHADONE UR-MCNC: NEGATIVE NG/ML — SIGNIFICANT CHANGE UP
OPIATES UR-MCNC: NEGATIVE NG/ML — SIGNIFICANT CHANGE UP
OXYCODONE UR QL SCN: NEGATIVE NG/ML — SIGNIFICANT CHANGE UP
PCP UR-MCNC: NEGATIVE NG/ML — SIGNIFICANT CHANGE UP
PH, URINE RESULT: 5.7 — SIGNIFICANT CHANGE UP (ref 4.5–8.9)
THC UR QL: SIGNIFICANT CHANGE UP NG/ML

## 2024-02-23 RX ORDER — BACLOFEN 100 %
1 POWDER (GRAM) MISCELLANEOUS
Qty: 4 | Refills: 0
Start: 2024-02-23 | End: 2024-02-24

## 2024-02-26 ENCOUNTER — APPOINTMENT (OUTPATIENT)
Dept: MRI IMAGING | Facility: CLINIC | Age: 24
End: 2024-02-26

## 2024-02-26 PROBLEM — F41.9 ANXIETY DISORDER, UNSPECIFIED: Chronic | Status: ACTIVE | Noted: 2024-02-17

## 2024-02-26 PROBLEM — F19.90 OTHER PSYCHOACTIVE SUBSTANCE USE, UNSPECIFIED, UNCOMPLICATED: Chronic | Status: ACTIVE | Noted: 2024-02-17

## 2024-03-05 NOTE — HISTORY OF PRESENT ILLNESS
[FreeTextEntry1] : 24 year old man seen 02/16/2024 with complaint of penile numbness. This began months ago. He reports cycling, but not for months. He denies perineal or penile trauma. No numbness or paraethesias in other areas. He has significant anxiety when discussing this and displays bizzare ideas about his health. He has tried multiple herbal medications and supplements for various ailments.

## 2024-03-05 NOTE — ASSESSMENT
[FreeTextEntry1] : 23 yo M with c/o penile numbness. Normal exam. Will send for MRI and consider referral to andrology or pelvic  if wnl.

## 2024-03-12 ENCOUNTER — EMERGENCY (EMERGENCY)
Facility: HOSPITAL | Age: 24
LOS: 0 days | Discharge: HOME | End: 2024-03-12
Attending: EMERGENCY MEDICINE
Payer: COMMERCIAL

## 2024-03-12 VITALS
TEMPERATURE: 98 F | RESPIRATION RATE: 18 BRPM | DIASTOLIC BLOOD PRESSURE: 81 MMHG | HEART RATE: 101 BPM | OXYGEN SATURATION: 96 % | SYSTOLIC BLOOD PRESSURE: 123 MMHG

## 2024-03-12 VITALS — WEIGHT: 179.9 LBS

## 2024-03-12 DIAGNOSIS — Z88.6 ALLERGY STATUS TO ANALGESIC AGENT: ICD-10-CM

## 2024-03-12 DIAGNOSIS — N50.812 LEFT TESTICULAR PAIN: ICD-10-CM

## 2024-03-12 DIAGNOSIS — N50.811 RIGHT TESTICULAR PAIN: ICD-10-CM

## 2024-03-12 DIAGNOSIS — Z88.0 ALLERGY STATUS TO PENICILLIN: ICD-10-CM

## 2024-03-12 DIAGNOSIS — Z91.018 ALLERGY TO OTHER FOODS: ICD-10-CM

## 2024-03-12 LAB
APPEARANCE UR: CLEAR — SIGNIFICANT CHANGE UP
BILIRUB UR-MCNC: NEGATIVE — SIGNIFICANT CHANGE UP
COLOR SPEC: YELLOW — SIGNIFICANT CHANGE UP
DIFF PNL FLD: NEGATIVE — SIGNIFICANT CHANGE UP
GLUCOSE UR QL: NEGATIVE MG/DL — SIGNIFICANT CHANGE UP
KETONES UR-MCNC: NEGATIVE MG/DL — SIGNIFICANT CHANGE UP
LEUKOCYTE ESTERASE UR-ACNC: NEGATIVE — SIGNIFICANT CHANGE UP
NITRITE UR-MCNC: NEGATIVE — SIGNIFICANT CHANGE UP
PH UR: 6.5 — SIGNIFICANT CHANGE UP (ref 5–8)
PROT UR-MCNC: NEGATIVE MG/DL — SIGNIFICANT CHANGE UP
SP GR SPEC: 1.01 — SIGNIFICANT CHANGE UP (ref 1–1.03)
UROBILINOGEN FLD QL: 0.2 MG/DL — SIGNIFICANT CHANGE UP (ref 0.2–1)

## 2024-03-12 PROCEDURE — 76870 US EXAM SCROTUM: CPT | Mod: 26

## 2024-03-12 PROCEDURE — 99284 EMERGENCY DEPT VISIT MOD MDM: CPT

## 2024-03-12 PROCEDURE — 76770 US EXAM ABDO BACK WALL COMP: CPT

## 2024-03-12 PROCEDURE — 76870 US EXAM SCROTUM: CPT

## 2024-03-12 PROCEDURE — 99284 EMERGENCY DEPT VISIT MOD MDM: CPT | Mod: 25

## 2024-03-12 PROCEDURE — 81003 URINALYSIS AUTO W/O SCOPE: CPT

## 2024-03-12 PROCEDURE — 87086 URINE CULTURE/COLONY COUNT: CPT

## 2024-03-12 PROCEDURE — 76770 US EXAM ABDO BACK WALL COMP: CPT | Mod: 26

## 2024-03-12 RX ORDER — ACETAMINOPHEN 500 MG
1000 TABLET ORAL ONCE
Refills: 0 | Status: COMPLETED | OUTPATIENT
Start: 2024-03-12 | End: 2024-03-12

## 2024-03-12 NOTE — ED STATDOCS - NS_ ATTENDINGSCRIBEDETAILS _ED_A_ED_FT
I, Sabas Greenfield MD,  performed the initial face to face bedside interview with this patient regarding history of present illness, review of symptoms and relevant past medical, social and family history.  I completed an independent physical examination.  I was the initial provider who evaluated this patient.   I personally saw the patient and performed a substantive portion of the visit including all aspects of the medical decision making.  The history, relevant review of systems, past medical and surgical history, medical decision making, and physical examination was documented by the scribe in my presence and I attest to the accuracy of the documentation.

## 2024-03-12 NOTE — ED STATDOCS - PROGRESS NOTE DETAILS
Connor: pts chart reviewed. pt seen by urology Dr. Somers last month for penile numbness. was scheduled for an MRI however pt cancelled. pt was also seen by [facility] 3/9 and today FLASH at [facility]: ua negative. US no acute findings. well need to fu with the urologist and reschedule his MRI

## 2024-03-12 NOTE — ED STATDOCS - OBJECTIVE STATEMENT
24-year-old male presents the emergency department with testicular pain.  Patient states he has had pain on both sides of his testicles but worse on the right.  States it happened about an hour after waking up no traumas was not doing anything when pain started.  States he thinks he may have slept on his back in an odd position and maybe that is why he is having pain.  No fevers no burning with urination no blood in the urine no penile rash or discharge.  Patient is not sexually active.  No vomiting.  Exam with normal testicular exam no redness swelling normal testicular lie normal cremasteric reflex uncircumcised no rashes no CVA tenderness.  Given complaints of pain will ultrasound rule out torsion ultrasound kidneys rule out hydro check urine pain control reassess

## 2024-03-12 NOTE — ED STATDOCS - NSFOLLOWUPINSTRUCTIONS_ED_ALL_ED_FT
Follow up with your urologist and speak to them about rescheduling the MRI  Please use 650mg tylenol (or acetaminophen) every 6 hours and 600mg motrin (or advil or ibuprofen) every 6 hours as needed for pain/discomfort/swelling.  Make sure not to use more than 3500mg in any 24 hour period.   Any worsening of symptoms or new concerning symptoms return to the ED

## 2024-03-12 NOTE — ED STATDOCS - ATTENDING APP SHARED VISIT CONTRIBUTION OF CARE
I, Sabas Greenfield MD, personally saw the patient with ACP.  I have personally performed a face to face diagnostic evaluation on this patient.  I have reviewed the ACP note and agree with the history, exam, and plan of care, except as noted. I personally made/approved the management plan and take responsibility for the patient management   The initial assessment was performed by myself and then the patient was handed off to the ACP. The patient was followed and re-evaluated by the ACP. All labs, imaging and procedures were evaluated and performed by the ACP and I was available for consultation if any questions in the patients care came up.   I personally made/approved the management plan and take responsibility for the patient management.

## 2024-03-12 NOTE — ED STATDOCS - PHYSICAL EXAMINATION
Constitutional: NAD AAOx3  Eyes: PERRLA EOMI  Head: Normocephalic atraumatic  Mouth: MMM  Cardiac: regular rate   Resp: unlabored breathing  GI: Abd s/nt/nd  Neuro: grossly normal and intact  Skin: No visible rashes  Gu: normal testicular exam, uncircumcised, no redness or swelling, normal testicular lie and normal cremasteric reflex Constitutional: NAD AAOx3  Eyes: PERRLA EOMI  Head: Normocephalic atraumatic  Mouth: MMM  Cardiac: regular rate   Resp: unlabored breathing clear b/l   GI: Abd s/nt/nd  Neuro: grossly normal and intact  Skin: No visible rashes  Gu: normal testicular exam, uncircumcised, no redness or swelling, normal testicular lie and normal cremasteric reflex

## 2024-03-12 NOTE — ED STATDOCS - PATIENT PORTAL LINK FT
You can access the FollowMyHealth Patient Portal offered by [facility] by registering at the following website: [email or web address]. By joining [employer]’s FollowMyHealth portal, you will also be able to view your health information using other applications (apps) compatible with our system.

## 2024-03-12 NOTE — ED ADULT NURSE NOTE - CHIEF COMPLAINT QUOTE
Pt presents to the ED c/o testicular pain. Pt reports onset of pain was around 3am, with worsening of pain throughout the day. Endorses nausea. Denies recent injuries.

## 2024-03-12 NOTE — ED STATDOCS - NSICDXPASTMEDICALHX_GEN_ALL_CORE_FT
PAST MEDICAL HISTORY:  Anxiety     No pertinent past medical history     Polysubstance use disorder

## 2024-03-12 NOTE — ED ADULT TRIAGE NOTE - CHIEF COMPLAINT QUOTE
Pt presents to the ED c/o testicular pain. Pt reports onset of pain was around 3am, with worsening of pain throughout the day. Endorses nausea. Pt presents to the ED c/o testicular pain. Pt reports onset of pain was around 3am, with worsening of pain throughout the day. Endorses nausea. Denies recent injuries.

## 2024-03-13 LAB
CULTURE RESULTS: SIGNIFICANT CHANGE UP
SPECIMEN SOURCE: SIGNIFICANT CHANGE UP

## 2024-03-20 ENCOUNTER — APPOINTMENT (OUTPATIENT)
Dept: MRI IMAGING | Facility: CLINIC | Age: 24
End: 2024-03-20
Payer: COMMERCIAL

## 2024-03-20 ENCOUNTER — OUTPATIENT (OUTPATIENT)
Dept: OUTPATIENT SERVICES | Facility: HOSPITAL | Age: 24
LOS: 1 days | End: 2024-03-20
Payer: COMMERCIAL

## 2024-03-20 DIAGNOSIS — N48.89 OTHER SPECIFIED DISORDERS OF PENIS: ICD-10-CM

## 2024-03-20 PROCEDURE — 72195 MRI PELVIS W/O DYE: CPT | Mod: 26

## 2024-03-20 PROCEDURE — 72195 MRI PELVIS W/O DYE: CPT

## 2024-03-22 ENCOUNTER — OUTPATIENT (OUTPATIENT)
Dept: OUTPATIENT SERVICES | Facility: HOSPITAL | Age: 24
LOS: 1 days | End: 2024-03-22
Payer: COMMERCIAL

## 2024-03-22 ENCOUNTER — APPOINTMENT (OUTPATIENT)
Dept: MRI IMAGING | Facility: CLINIC | Age: 24
End: 2024-03-22

## 2024-03-22 ENCOUNTER — APPOINTMENT (OUTPATIENT)
Dept: MRI IMAGING | Facility: CLINIC | Age: 24
End: 2024-03-22
Payer: COMMERCIAL

## 2024-03-22 DIAGNOSIS — Z00.8 ENCOUNTER FOR OTHER GENERAL EXAMINATION: ICD-10-CM

## 2024-03-22 PROCEDURE — 72148 MRI LUMBAR SPINE W/O DYE: CPT

## 2024-03-22 PROCEDURE — 70551 MRI BRAIN STEM W/O DYE: CPT | Mod: 26

## 2024-03-22 PROCEDURE — 72148 MRI LUMBAR SPINE W/O DYE: CPT | Mod: 26

## 2024-03-22 PROCEDURE — 70551 MRI BRAIN STEM W/O DYE: CPT

## 2024-03-23 ENCOUNTER — APPOINTMENT (OUTPATIENT)
Dept: MRI IMAGING | Facility: CLINIC | Age: 24
End: 2024-03-23

## 2024-03-27 ENCOUNTER — NON-APPOINTMENT (OUTPATIENT)
Age: 24
End: 2024-03-27

## 2024-09-27 ENCOUNTER — TRANSCRIPTION ENCOUNTER (OUTPATIENT)
Age: 24
End: 2024-09-27